# Patient Record
Sex: FEMALE | Race: WHITE | NOT HISPANIC OR LATINO | Employment: OTHER | URBAN - METROPOLITAN AREA
[De-identification: names, ages, dates, MRNs, and addresses within clinical notes are randomized per-mention and may not be internally consistent; named-entity substitution may affect disease eponyms.]

---

## 2024-11-29 ENCOUNTER — APPOINTMENT (OUTPATIENT)
Dept: RADIOLOGY | Facility: MEDICAL CENTER | Age: 69
DRG: 036 | End: 2024-11-29
Attending: STUDENT IN AN ORGANIZED HEALTH CARE EDUCATION/TRAINING PROGRAM
Payer: COMMERCIAL

## 2024-11-29 ENCOUNTER — HOSPITAL ENCOUNTER (INPATIENT)
Facility: MEDICAL CENTER | Age: 69
End: 2024-11-29
Attending: STUDENT IN AN ORGANIZED HEALTH CARE EDUCATION/TRAINING PROGRAM | Admitting: STUDENT IN AN ORGANIZED HEALTH CARE EDUCATION/TRAINING PROGRAM
Payer: COMMERCIAL

## 2024-11-29 DIAGNOSIS — R00.0 SINUS TACHYCARDIA: ICD-10-CM

## 2024-11-29 DIAGNOSIS — J81.0 ACUTE PULMONARY EDEMA (HCC): ICD-10-CM

## 2024-11-29 DIAGNOSIS — I65.22 CAROTID STENOSIS, LEFT: ICD-10-CM

## 2024-11-29 DIAGNOSIS — R47.1 DYSARTHRIA: ICD-10-CM

## 2024-11-29 DIAGNOSIS — R20.0 NUMBNESS: ICD-10-CM

## 2024-11-29 DIAGNOSIS — I65.22 ICAO (INTERNAL CAROTID ARTERY OCCLUSION), LEFT: ICD-10-CM

## 2024-11-29 PROBLEM — G45.9 TRANSIENT ISCHEMIC ATTACK: Status: ACTIVE | Noted: 2024-11-29

## 2024-11-29 LAB
ABO + RH BLD: NORMAL
ABO GROUP BLD: NORMAL
ALBUMIN SERPL BCP-MCNC: 4.5 G/DL (ref 3.2–4.9)
ALBUMIN/GLOB SERPL: 1.6 G/DL
ALP SERPL-CCNC: 96 U/L (ref 30–99)
ALT SERPL-CCNC: 28 U/L (ref 2–50)
ANION GAP SERPL CALC-SCNC: 14 MMOL/L (ref 7–16)
APTT PPP: 27.6 SEC (ref 24.7–36)
AST SERPL-CCNC: 28 U/L (ref 12–45)
BASOPHILS # BLD AUTO: 0.5 % (ref 0–1.8)
BASOPHILS # BLD: 0.04 K/UL (ref 0–0.12)
BILIRUB SERPL-MCNC: 0.2 MG/DL (ref 0.1–1.5)
BLD GP AB SCN SERPL QL: NORMAL
BUN SERPL-MCNC: 15 MG/DL (ref 8–22)
CALCIUM ALBUM COR SERPL-MCNC: 9.5 MG/DL (ref 8.5–10.5)
CALCIUM SERPL-MCNC: 9.9 MG/DL (ref 8.5–10.5)
CHLORIDE SERPL-SCNC: 101 MMOL/L (ref 96–112)
CO2 SERPL-SCNC: 21 MMOL/L (ref 20–33)
CREAT SERPL-MCNC: 0.99 MG/DL (ref 0.5–1.4)
EKG IMPRESSION: NORMAL
EOSINOPHIL # BLD AUTO: 0.19 K/UL (ref 0–0.51)
EOSINOPHIL NFR BLD: 2.4 % (ref 0–6.9)
ERYTHROCYTE [DISTWIDTH] IN BLOOD BY AUTOMATED COUNT: 44 FL (ref 35.9–50)
EST. AVERAGE GLUCOSE BLD GHB EST-MCNC: 97 MG/DL
GFR SERPLBLD CREATININE-BSD FMLA CKD-EPI: 63 ML/MIN/1.73 M 2
GLOBULIN SER CALC-MCNC: 2.9 G/DL (ref 1.9–3.5)
GLUCOSE BLD STRIP.AUTO-MCNC: 104 MG/DL (ref 65–99)
GLUCOSE SERPL-MCNC: 102 MG/DL (ref 65–99)
HBA1C MFR BLD: 5 % (ref 4–5.6)
HCT VFR BLD AUTO: 46.3 % (ref 37–47)
HGB BLD-MCNC: 15.9 G/DL (ref 12–16)
IMM GRANULOCYTES # BLD AUTO: 0.02 K/UL (ref 0–0.11)
IMM GRANULOCYTES NFR BLD AUTO: 0.3 % (ref 0–0.9)
INR PPP: 0.95 (ref 0.87–1.13)
LYMPHOCYTES # BLD AUTO: 3.56 K/UL (ref 1–4.8)
LYMPHOCYTES NFR BLD: 45.2 % (ref 22–41)
MCH RBC QN AUTO: 33.2 PG (ref 27–33)
MCHC RBC AUTO-ENTMCNC: 34.3 G/DL (ref 32.2–35.5)
MCV RBC AUTO: 96.7 FL (ref 81.4–97.8)
MONOCYTES # BLD AUTO: 0.68 K/UL (ref 0–0.85)
MONOCYTES NFR BLD AUTO: 8.6 % (ref 0–13.4)
NEUTROPHILS # BLD AUTO: 3.38 K/UL (ref 1.82–7.42)
NEUTROPHILS NFR BLD: 43 % (ref 44–72)
NRBC # BLD AUTO: 0 K/UL
NRBC BLD-RTO: 0 /100 WBC (ref 0–0.2)
PLATELET # BLD AUTO: 181 K/UL (ref 164–446)
PMV BLD AUTO: 13.3 FL (ref 9–12.9)
POTASSIUM SERPL-SCNC: 4 MMOL/L (ref 3.6–5.5)
PROT SERPL-MCNC: 7.4 G/DL (ref 6–8.2)
PROTHROMBIN TIME: 12.7 SEC (ref 12–14.6)
RBC # BLD AUTO: 4.79 M/UL (ref 4.2–5.4)
RH BLD: NORMAL
SODIUM SERPL-SCNC: 136 MMOL/L (ref 135–145)
TROPONIN T SERPL-MCNC: 9 NG/L (ref 6–19)
WBC # BLD AUTO: 7.9 K/UL (ref 4.8–10.8)

## 2024-11-29 PROCEDURE — 71045 X-RAY EXAM CHEST 1 VIEW: CPT

## 2024-11-29 PROCEDURE — 85730 THROMBOPLASTIN TIME PARTIAL: CPT

## 2024-11-29 PROCEDURE — 700111 HCHG RX REV CODE 636 W/ 250 OVERRIDE (IP): Mod: JG | Performed by: STUDENT IN AN ORGANIZED HEALTH CARE EDUCATION/TRAINING PROGRAM

## 2024-11-29 PROCEDURE — 85610 PROTHROMBIN TIME: CPT

## 2024-11-29 PROCEDURE — 700102 HCHG RX REV CODE 250 W/ 637 OVERRIDE(OP): Performed by: STUDENT IN AN ORGANIZED HEALTH CARE EDUCATION/TRAINING PROGRAM

## 2024-11-29 PROCEDURE — 99223 1ST HOSP IP/OBS HIGH 75: CPT | Performed by: STUDENT IN AN ORGANIZED HEALTH CARE EDUCATION/TRAINING PROGRAM

## 2024-11-29 PROCEDURE — 86850 RBC ANTIBODY SCREEN: CPT

## 2024-11-29 PROCEDURE — 82962 GLUCOSE BLOOD TEST: CPT

## 2024-11-29 PROCEDURE — 94760 N-INVAS EAR/PLS OXIMETRY 1: CPT

## 2024-11-29 PROCEDURE — 80053 COMPREHEN METABOLIC PANEL: CPT

## 2024-11-29 PROCEDURE — 86901 BLOOD TYPING SEROLOGIC RH(D): CPT | Mod: 91

## 2024-11-29 PROCEDURE — 70496 CT ANGIOGRAPHY HEAD: CPT

## 2024-11-29 PROCEDURE — 700117 HCHG RX CONTRAST REV CODE 255: Performed by: STUDENT IN AN ORGANIZED HEALTH CARE EDUCATION/TRAINING PROGRAM

## 2024-11-29 PROCEDURE — 0042T CT-CEREBRAL PERFUSION ANALYSIS: CPT

## 2024-11-29 PROCEDURE — 70498 CT ANGIOGRAPHY NECK: CPT

## 2024-11-29 PROCEDURE — 36415 COLL VENOUS BLD VENIPUNCTURE: CPT

## 2024-11-29 PROCEDURE — 770020 HCHG ROOM/CARE - TELE (206)

## 2024-11-29 PROCEDURE — 85025 COMPLETE CBC W/AUTO DIFF WBC: CPT

## 2024-11-29 PROCEDURE — 93005 ELECTROCARDIOGRAM TRACING: CPT | Performed by: STUDENT IN AN ORGANIZED HEALTH CARE EDUCATION/TRAINING PROGRAM

## 2024-11-29 PROCEDURE — 86900 BLOOD TYPING SEROLOGIC ABO: CPT | Mod: 91

## 2024-11-29 PROCEDURE — A9270 NON-COVERED ITEM OR SERVICE: HCPCS | Performed by: STUDENT IN AN ORGANIZED HEALTH CARE EDUCATION/TRAINING PROGRAM

## 2024-11-29 PROCEDURE — 84484 ASSAY OF TROPONIN QUANT: CPT

## 2024-11-29 PROCEDURE — 99285 EMERGENCY DEPT VISIT HI MDM: CPT

## 2024-11-29 PROCEDURE — 70450 CT HEAD/BRAIN W/O DYE: CPT

## 2024-11-29 PROCEDURE — 83036 HEMOGLOBIN GLYCOSYLATED A1C: CPT

## 2024-11-29 RX ORDER — ONDANSETRON 2 MG/ML
4 INJECTION INTRAMUSCULAR; INTRAVENOUS EVERY 4 HOURS PRN
Status: DISCONTINUED | OUTPATIENT
Start: 2024-11-29 | End: 2024-12-02 | Stop reason: HOSPADM

## 2024-11-29 RX ORDER — PHENOL 1.4 %
1 AEROSOL, SPRAY (ML) MUCOUS MEMBRANE
COMMUNITY

## 2024-11-29 RX ORDER — ACETAMINOPHEN 325 MG/1
650 TABLET ORAL EVERY 6 HOURS PRN
Status: DISCONTINUED | OUTPATIENT
Start: 2024-11-29 | End: 2024-12-02 | Stop reason: HOSPADM

## 2024-11-29 RX ORDER — HYDRALAZINE HYDROCHLORIDE 20 MG/ML
10 INJECTION INTRAMUSCULAR; INTRAVENOUS
Status: DISCONTINUED | OUTPATIENT
Start: 2024-11-29 | End: 2024-11-29

## 2024-11-29 RX ORDER — LABETALOL HYDROCHLORIDE 5 MG/ML
10 INJECTION, SOLUTION INTRAVENOUS EVERY 4 HOURS PRN
Status: DISCONTINUED | OUTPATIENT
Start: 2024-11-29 | End: 2024-11-30

## 2024-11-29 RX ORDER — LABETALOL HYDROCHLORIDE 5 MG/ML
10 INJECTION, SOLUTION INTRAVENOUS
Status: DISCONTINUED | OUTPATIENT
Start: 2024-11-29 | End: 2024-11-29

## 2024-11-29 RX ORDER — ALPRAZOLAM 0.25 MG/1
0.25 TABLET ORAL ONCE
Status: COMPLETED | OUTPATIENT
Start: 2024-11-30 | End: 2024-11-29

## 2024-11-29 RX ORDER — SODIUM CHLORIDE 9 MG/ML
500 INJECTION, SOLUTION INTRAVENOUS
Status: DISCONTINUED | OUTPATIENT
Start: 2024-11-29 | End: 2024-11-29

## 2024-11-29 RX ORDER — ASPIRIN 81 MG/1
81 TABLET, CHEWABLE ORAL DAILY
Status: DISCONTINUED | OUTPATIENT
Start: 2024-11-29 | End: 2024-12-02 | Stop reason: HOSPADM

## 2024-11-29 RX ORDER — ATORVASTATIN CALCIUM 40 MG/1
40 TABLET, FILM COATED ORAL EVERY EVENING
Status: DISCONTINUED | OUTPATIENT
Start: 2024-11-29 | End: 2024-12-02

## 2024-11-29 RX ORDER — AMLODIPINE BESYLATE 10 MG/1
10 TABLET ORAL
Status: DISCONTINUED | OUTPATIENT
Start: 2024-11-30 | End: 2024-11-30

## 2024-11-29 RX ADMIN — ALPRAZOLAM 0.25 MG: 0.25 TABLET ORAL at 23:44

## 2024-11-29 RX ADMIN — AMLODIPINE BESYLATE 10 MG: 10 TABLET ORAL at 23:42

## 2024-11-29 RX ADMIN — ASPIRIN 81 MG: 81 TABLET, CHEWABLE ORAL at 22:46

## 2024-11-29 RX ADMIN — LABETALOL HYDROCHLORIDE 10 MG: 5 INJECTION, SOLUTION INTRAVENOUS at 23:45

## 2024-11-29 RX ADMIN — IOHEXOL 80 ML: 350 INJECTION, SOLUTION INTRAVENOUS at 21:15

## 2024-11-29 RX ADMIN — IOHEXOL 40 ML: 350 INJECTION, SOLUTION INTRAVENOUS at 21:15

## 2024-11-29 RX ADMIN — ATORVASTATIN CALCIUM 40 MG: 40 TABLET, FILM COATED ORAL at 22:46

## 2024-11-29 SDOH — ECONOMIC STABILITY: TRANSPORTATION INSECURITY
IN THE PAST 12 MONTHS, HAS LACK OF RELIABLE TRANSPORTATION KEPT YOU FROM MEDICAL APPOINTMENTS, MEETINGS, WORK OR FROM GETTING THINGS NEEDED FOR DAILY LIVING?: NO

## 2024-11-29 SDOH — ECONOMIC STABILITY: TRANSPORTATION INSECURITY
IN THE PAST 12 MONTHS, HAS THE LACK OF TRANSPORTATION KEPT YOU FROM MEDICAL APPOINTMENTS OR FROM GETTING MEDICATIONS?: NO

## 2024-11-29 ASSESSMENT — SOCIAL DETERMINANTS OF HEALTH (SDOH)
WITHIN THE LAST YEAR, HAVE YOU BEEN KICKED, HIT, SLAPPED, OR OTHERWISE PHYSICALLY HURT BY YOUR PARTNER OR EX-PARTNER?: NO
WITHIN THE LAST YEAR, HAVE TO BEEN RAPED OR FORCED TO HAVE ANY KIND OF SEXUAL ACTIVITY BY YOUR PARTNER OR EX-PARTNER?: NO
WITHIN THE LAST YEAR, HAVE YOU BEEN AFRAID OF YOUR PARTNER OR EX-PARTNER?: NO
WITHIN THE PAST 12 MONTHS, YOU WORRIED THAT YOUR FOOD WOULD RUN OUT BEFORE YOU GOT THE MONEY TO BUY MORE: NEVER TRUE
WITHIN THE PAST 12 MONTHS, THE FOOD YOU BOUGHT JUST DIDN'T LAST AND YOU DIDN'T HAVE MONEY TO GET MORE: NEVER TRUE
WITHIN THE LAST YEAR, HAVE YOU BEEN HUMILIATED OR EMOTIONALLY ABUSED IN OTHER WAYS BY YOUR PARTNER OR EX-PARTNER?: NO
IN THE PAST 12 MONTHS, HAS THE ELECTRIC, GAS, OIL, OR WATER COMPANY THREATENED TO SHUT OFF SERVICE IN YOUR HOME?: NO

## 2024-11-29 ASSESSMENT — ENCOUNTER SYMPTOMS
FOCAL WEAKNESS: 1
PSYCHIATRIC NEGATIVE: 1
CONSTITUTIONAL NEGATIVE: 1
CARDIOVASCULAR NEGATIVE: 1
RESPIRATORY NEGATIVE: 1
GASTROINTESTINAL NEGATIVE: 1
MUSCULOSKELETAL NEGATIVE: 1
EYES NEGATIVE: 1

## 2024-11-29 ASSESSMENT — COGNITIVE AND FUNCTIONAL STATUS - GENERAL
MOBILITY SCORE: 24
DAILY ACTIVITIY SCORE: 24
SUGGESTED CMS G CODE MODIFIER DAILY ACTIVITY: CH
SUGGESTED CMS G CODE MODIFIER MOBILITY: CH

## 2024-11-29 ASSESSMENT — PATIENT HEALTH QUESTIONNAIRE - PHQ9
2. FEELING DOWN, DEPRESSED, IRRITABLE, OR HOPELESS: NOT AT ALL
SUM OF ALL RESPONSES TO PHQ9 QUESTIONS 1 AND 2: 0
1. LITTLE INTEREST OR PLEASURE IN DOING THINGS: NOT AT ALL

## 2024-11-29 ASSESSMENT — PAIN DESCRIPTION - PAIN TYPE: TYPE: ACUTE PAIN

## 2024-11-29 ASSESSMENT — LIFESTYLE VARIABLES
ALCOHOL_USE: NO
HAVE YOU EVER FELT YOU SHOULD CUT DOWN ON YOUR DRINKING: NO
DOES PATIENT WANT TO STOP DRINKING: NO
HOW MANY TIMES IN THE PAST YEAR HAVE YOU HAD 5 OR MORE DRINKS IN A DAY: 0
HAVE PEOPLE ANNOYED YOU BY CRITICIZING YOUR DRINKING: NO
EVER FELT BAD OR GUILTY ABOUT YOUR DRINKING: NO
AVERAGE NUMBER OF DAYS PER WEEK YOU HAVE A DRINK CONTAINING ALCOHOL: 0
TOTAL SCORE: 0
ON A TYPICAL DAY WHEN YOU DRINK ALCOHOL HOW MANY DRINKS DO YOU HAVE: 0
CONSUMPTION TOTAL: NEGATIVE
TOTAL SCORE: 0
TOTAL SCORE: 0
EVER HAD A DRINK FIRST THING IN THE MORNING TO STEADY YOUR NERVES TO GET RID OF A HANGOVER: NO

## 2024-11-29 ASSESSMENT — FIBROSIS 4 INDEX: FIB4 SCORE: 1.93

## 2024-11-30 ENCOUNTER — APPOINTMENT (OUTPATIENT)
Dept: RADIOLOGY | Facility: MEDICAL CENTER | Age: 69
DRG: 036 | End: 2024-11-30
Attending: STUDENT IN AN ORGANIZED HEALTH CARE EDUCATION/TRAINING PROGRAM
Payer: COMMERCIAL

## 2024-11-30 VITALS
HEART RATE: 57 BPM | RESPIRATION RATE: 15 BRPM | DIASTOLIC BLOOD PRESSURE: 75 MMHG | HEIGHT: 64 IN | WEIGHT: 192.46 LBS | SYSTOLIC BLOOD PRESSURE: 166 MMHG | OXYGEN SATURATION: 95 % | BODY MASS INDEX: 32.86 KG/M2 | TEMPERATURE: 97 F

## 2024-11-30 PROBLEM — I63.9 ACUTE CVA (CEREBROVASCULAR ACCIDENT) (HCC): Status: ACTIVE | Noted: 2024-11-29

## 2024-11-30 PROBLEM — I65.22 CAROTID STENOSIS, LEFT: Status: ACTIVE | Noted: 2024-11-30

## 2024-11-30 PROBLEM — I65.22 ICAO (INTERNAL CAROTID ARTERY OCCLUSION), LEFT: Status: ACTIVE | Noted: 2024-11-30

## 2024-11-30 LAB
CHOLEST SERPL-MCNC: 203 MG/DL (ref 100–199)
HDLC SERPL-MCNC: 51 MG/DL
LDLC SERPL CALC-MCNC: 133 MG/DL
TRIGL SERPL-MCNC: 93 MG/DL (ref 0–149)

## 2024-11-30 PROCEDURE — A9270 NON-COVERED ITEM OR SERVICE: HCPCS | Performed by: STUDENT IN AN ORGANIZED HEALTH CARE EDUCATION/TRAINING PROGRAM

## 2024-11-30 PROCEDURE — 700102 HCHG RX REV CODE 250 W/ 637 OVERRIDE(OP): Performed by: PSYCHIATRY & NEUROLOGY

## 2024-11-30 PROCEDURE — 99223 1ST HOSP IP/OBS HIGH 75: CPT | Performed by: PSYCHIATRY & NEUROLOGY

## 2024-11-30 PROCEDURE — 80061 LIPID PANEL: CPT

## 2024-11-30 PROCEDURE — 700101 HCHG RX REV CODE 250: Performed by: INTERNAL MEDICINE

## 2024-11-30 PROCEDURE — 99291 CRITICAL CARE FIRST HOUR: CPT | Performed by: INTERNAL MEDICINE

## 2024-11-30 PROCEDURE — 700102 HCHG RX REV CODE 250 W/ 637 OVERRIDE(OP): Performed by: STUDENT IN AN ORGANIZED HEALTH CARE EDUCATION/TRAINING PROGRAM

## 2024-11-30 PROCEDURE — 770022 HCHG ROOM/CARE - ICU (200)

## 2024-11-30 PROCEDURE — A9270 NON-COVERED ITEM OR SERVICE: HCPCS | Performed by: PSYCHIATRY & NEUROLOGY

## 2024-11-30 PROCEDURE — 99291 CRITICAL CARE FIRST HOUR: CPT | Performed by: STUDENT IN AN ORGANIZED HEALTH CARE EDUCATION/TRAINING PROGRAM

## 2024-11-30 PROCEDURE — 70551 MRI BRAIN STEM W/O DYE: CPT

## 2024-11-30 RX ORDER — NOREPINEPHRINE BITARTRATE 0.03 MG/ML
0-1 INJECTION, SOLUTION INTRAVENOUS CONTINUOUS
Status: DISCONTINUED | OUTPATIENT
Start: 2024-11-30 | End: 2024-12-02

## 2024-11-30 RX ORDER — ASPIRIN 81 MG/1
243 TABLET ORAL ONCE
Status: COMPLETED | OUTPATIENT
Start: 2024-11-30 | End: 2024-11-30

## 2024-11-30 RX ORDER — ALPRAZOLAM 0.25 MG/1
0.25 TABLET ORAL
Status: COMPLETED | OUTPATIENT
Start: 2024-11-30 | End: 2024-11-30

## 2024-11-30 RX ADMIN — NOREPINEPHRINE BITARTRATE 0.05 MCG/KG/MIN: 0.03 INJECTION, SOLUTION INTRAVENOUS at 21:50

## 2024-11-30 RX ADMIN — ALPRAZOLAM 0.25 MG: 0.25 TABLET ORAL at 11:17

## 2024-11-30 RX ADMIN — ATORVASTATIN CALCIUM 40 MG: 40 TABLET, FILM COATED ORAL at 18:13

## 2024-11-30 RX ADMIN — TICAGRELOR 180 MG: 90 TABLET ORAL at 14:11

## 2024-11-30 RX ADMIN — ASPIRIN 243 MG: 81 TABLET, COATED ORAL at 14:51

## 2024-11-30 ASSESSMENT — FIBROSIS 4 INDEX
FIB4 SCORE: 1.93
FIB4 SCORE: 1.93

## 2024-11-30 ASSESSMENT — ENCOUNTER SYMPTOMS
DIARRHEA: 0
SHORTNESS OF BREATH: 0
ABDOMINAL PAIN: 0
HEADACHES: 0
FEVER: 0
NERVOUS/ANXIOUS: 0
NAUSEA: 0
WEAKNESS: 0
VOMITING: 0
BACK PAIN: 0

## 2024-11-30 ASSESSMENT — PAIN DESCRIPTION - PAIN TYPE
TYPE: ACUTE PAIN

## 2024-11-30 NOTE — CARE PLAN
The patient is Watcher - medium risk of patient decline/worsening.    Shift Goals  Clinical Goals: MRI, SPP/VSS, Safety  Patient Goals: Sleep  Family Goals: CATY    Progress made toward(s) clinical / shift goals:      Problem: Optimal Care of the Stroke Patient  Goal: Optimal acute care for the stroke patient  Outcome: Progressing  Note: - Vital signs and neuro checks performed and documented per order   - NIHSS completed and documented per order   - Continuous telemetry monitoring    - Head CT without contrast obtained   - MRI obtained   - Echocardiogram with Bubble Study ordered   - CTA Neck obtained   - Lipid Panel obtained   - Dysphagia screen completed and documented prior to any PO intake.   - Neurology and Neurology IR consult placed      Problem: Knowledge Deficit - Stroke Education  Goal: Patient's knowledge of stroke and risk factors will improve  Outcome: Progressing  Patient educated of disease process and plan of care. Treatment team has included patient in plan of care. All medications indications and side effects were explained. Patient encouraged to ask questions. Patient verbalizes understanding.      Problem: Neuro Status  Goal: Neuro status will remain stable or improve  Outcome: Progressing     Problem: Hemodynamic Monitoring  Goal: Patient's hemodynamics, fluid balance and neurologic status will be stable or improve  Outcome: Progressing  Patient vitals assessed Q4 and prn. Patient on continuous telemetry monitoring. Patient neuro status assessed and intact. Patient assessed for signs of decreased cardiac output. Patient intake and output measured Q4.         Patient is not progressing towards the following goals: N/A

## 2024-11-30 NOTE — HOSPITAL COURSE
Leatha Desai is a 68-year-old female with unremarkable PMHx as she has not seen a physician in decades.  Admitted 11/29 for right sided weakness and right facial droop.    Per history-patient noted progressive weakness starting in her right lower extremity up to her right upper extremity.  It was associated with a right-sided facial droop.  Symptoms lasted for approximately 30 minutes and resolved.  Similar symptoms that occurred approximately 24 hours prior.    In the emergency room-CT head showing small areas of encephalomalacia of the cortex and left frontoparietal vertex.  CTA neck with subtotal occlusion of left proximal internal carotid artery.  CTA head normal.    MRI brain is pending.

## 2024-11-30 NOTE — PROGRESS NOTES
Pt off unit for MRI head, monitor room notified pt off tele. Tele monitor at bedside.     1222: Pt back from MRI, monitor room notified.

## 2024-11-30 NOTE — CONSULTS
Neurology STROKE CODE H&P  Neurohospitalist Service, [facility]    Referring Physician: Diana Prasad M.D.    STROKE CODE:   Chief Complaint   Patient presents with    Numbness     Pt reports right foot cramping approx 1 hour prior that radiated up her right leg and turned into numbness. Pt states the numbness then traveled up her right arm and she felt as though she cannot speak. Pt says that she feels some numbness in her RUE but leg numbness has subsided. -thinners/ASA       To obtain the most accurate data regarding the time called, and time patient seen, refer to the stroke run-sheet and chart.  For time of CT, refer to the radiology report. See A&P below for TPA Decision and door to needle time if and when applicable.    HPI:   68-year-old female does not see a physician regularly presented last night after a 30-minute episode of right-sided numbness and speech issues.  She says she cannot understand anything being spoken to her at the time.  Symptoms have completely resolved.  CTA last night showed a critical stenosis of the left ICA at the bifurcation.  MRI brain this morning showed small embolic appearing infarct over the left hemisphere.  Does not take medications at home.      Review of systems: In addition to what is detailed in the HPI above, (and scanned into the chart if and when applicable), all other systems reviewed and are negative.    Past Medical History:    has no past medical history on file.  Unknown    FHx:  family history is not on file.  No relief of stroke    SHx:   reports that she has never smoked. She has never used smokeless tobacco. She reports that she does not currently use alcohol. She reports that she does not use drugs.    Allergies:  Allergies   Allergen Reactions    Bloodless        Medications:    Current Facility-Administered Medications:     [START ON 12/1/2024] ticagrelor (Brilinta) tablet 90 mg, 90 mg, Oral, BID, Mike Pepper M.D.     ticagrelor (Brilinta) tablet 180 mg, 180 mg, Oral, Once, Mike Pepper M.D.    aspirin (Asa) chewable tab 81 mg, 81 mg, Oral, DAILY, Amaury Jennings M.D., 81 mg at 11/29/24 2246    atorvastatin (Lipitor) tablet 40 mg, 40 mg, Oral, Q EVENING, Amaury Jennings M.D., 40 mg at 11/29/24 2246    acetaminophen (Tylenol) tablet 650 mg, 650 mg, Oral, Q6HRS PRN, Amaury Jennings M.D.    labetalol (Normodyne/Trandate) injection 10 mg, 10 mg, Intravenous, Q4HRS PRN, Amaury Jennings M.D., 10 mg at 11/29/24 2345    ondansetron (Zofran) syringe/vial injection 4 mg, 4 mg, Intravenous, Q4HRS PRN, Amaury Jennings M.D.    amLODIPine (Norvasc) tablet 10 mg, 10 mg, Oral, Q DAY, Amaury Jennings M.D., 10 mg at 11/29/24 2342    Physical Examination:    Vitals:    11/30/24 0035 11/30/24 0300 11/30/24 0712 11/30/24 1118   BP: (!) 146/81  111/84 (!) 148/96   Pulse:  (!) 58 61 76   Resp:  15 16 16   Temp:  36.5 °C (97.7 °F) 36.5 °C (97.7 °F) 36.4 °C (97.5 °F)   TempSrc:  Temporal Temporal Temporal   SpO2:  97% 97% 97%   Weight:  88.5 kg (195 lb 1.7 oz)     Height:           General: Patient is awake and in no acute distress  Eyes: Cannot visualize fundus.  CV: RRR    NEUROLOGICAL EXAM:     Mental status: Awake, alert and fully oriented, follows commands  Speech and language: speech is clear and fluent. The patient is able to name and repeat.  Cranial nerve exam: Pupils are equal, round and reactive to light bilaterally. Visual fields are full. Extraocular muscles are intact. Sensation in the face is intact to light touch. Face is symmetric. Hearing to finger rub equal. Palate elevates symmetrically. Shoulder shrug is full. Tongue is midline.  Motor exam: Sustained antigravity and in all 4   sensory exam: No sensory deficits identified   Deep tendon reflexes:  2+ and symmetric. Toes down-going bilaterally.  Coordination: no ataxia   Gait: Normal    NIH Stroke Scale:    1a. Level of Consciousness (Alert, drowsy, etc): 0=  Alert    1b. LOC Questions (Month, age): 0= Answers both correctly    1c. LOC Commands (Open/close eyes make fist/let go): 0= Obeys both correctly    2.   Best Gaze (Eyes open - patient follows examiner's finger on face): 0= Normal    3.   Visual Fields (introduce visual stimulus/threat to patient's field quadrants): 0= No visual loss  4.   Facial Paresis (Show teeth, raise eyebrows and squeeze eyes shut): 0= Normal     5a. Motor Arm - Left (Elevate arm to 90 degrees if patient is sitting, 45 degrees if  supine): 0= No drift    5b. Motor Arm - Right (Elevate arm to 90 degrees if patient is sitting, 45 degrees if supine): 0= No drift    6a. Motor Leg - Left (Elevate leg 30 degrees with patient supine): 0= No drift    6b. Motor Leg - Right  (Elevate leg 30 degrees with patient supine): 0= No drift    7.   Limb Ataxia (Finger-nose, heel down shin): 0= No ataxia    8.   Sensory (Pin prick to face, arm, trunk and leg - compare side to side): 0= Normal    9.  Best Language (Name item, describe a picture and read sentences): 0= No aphasia    10. Dysarthria (Evaluate speech clarity by patient repeating listed words): 0= Normal articulation    11. Extinction and Inattention (Use information from prior testing to identify neglect or  double simultaneous stimuli testing): 0= No neglect    Total NIH Score: 0    Modified [place] Scale (MRS): 0 = No symptoms      Objective Data:    Labs:  Lab Results   Component Value Date/Time    PROTHROMBTM 12.7 11/29/2024 08:27 PM    INR 0.95 11/29/2024 08:27 PM      Lab Results   Component Value Date/Time    WBC 7.9 11/29/2024 08:27 PM    RBC 4.79 11/29/2024 08:27 PM    HEMOGLOBIN 15.9 11/29/2024 08:27 PM    HEMATOCRIT 46.3 11/29/2024 08:27 PM    MCV 96.7 11/29/2024 08:27 PM    MCH 33.2 (H) 11/29/2024 08:27 PM    MCHC 34.3 11/29/2024 08:27 PM    MPV 13.3 (H) 11/29/2024 08:27 PM    NEUTSPOLYS 43.00 (L) 11/29/2024 08:27 PM    LYMPHOCYTES 45.20 (H) 11/29/2024 08:27 PM    MONOCYTES 8.60 11/29/2024  08:27 PM    EOSINOPHILS 2.40 11/29/2024 08:27 PM    BASOPHILS 0.50 11/29/2024 08:27 PM      Lab Results   Component Value Date/Time    SODIUM 136 11/29/2024 08:27 PM    POTASSIUM 4.0 11/29/2024 08:27 PM    CHLORIDE 101 11/29/2024 08:27 PM    CO2 21 11/29/2024 08:27 PM    GLUCOSE 102 (H) 11/29/2024 08:27 PM    BUN 15 11/29/2024 08:27 PM    CREATININE 0.99 11/29/2024 08:27 PM      Lab Results   Component Value Date/Time    CHOLSTRLTOT 203 (H) 11/30/2024 02:24 AM     (H) 11/30/2024 02:24 AM    HDL 51 11/30/2024 02:24 AM    TRIGLYCERIDE 93 11/30/2024 02:24 AM       Lab Results   Component Value Date/Time    ALKPHOSPHAT 96 11/29/2024 08:27 PM    ASTSGOT 28 11/29/2024 08:27 PM    ALTSGPT 28 11/29/2024 08:27 PM    TBILIRUBIN 0.2 11/29/2024 08:27 PM        Imaging/Testing:  MR-BRAIN-W/O   Final Result      1.  Several punctate acute to subacute left temporal and parietal lobe infarcts.   2.  Slow flow or occlusion of the left internal carotid artery below the level of the clinoid process. There is reconstitution at the level of the [place].   3.  Mild diffuse cerebral and cerebellar substance loss.   4.  Mild microangiopathic ischemic change versus demyelination or gliosis.      DX-CHEST-PORTABLE (1 VIEW)   Final Result      Interstitial pulmonary edema      CT-CTA NECK WITH & W/O-POST PROCESSING   Final Result      Occlusion or subtotal occlusion of the proximal LEFT internal carotid artery with estimated 50% stenosis of the petrous portion of the LEFT internal carotid artery      CT-CTA HEAD WITH & W/O-POST PROCESS   Final Result      CT angiogram of the [place] within normal limits.         CT-CEREBRAL PERFUSION ANALYSIS   Final Result      1. Cerebral blood flow less than 30% possibly representing completed infarct = 0 mL.      2. T Max more than 6 seconds possibly representing combination of completed infarct and ischemia = 0 mL.      3. Mismatched volume possibly representing ischemic  "brain/penumbra= 0 mL      4.  Please note that this cerebral perfusion study and report is Quantitative and targets supratentorial (cerebral) perfusion for evaluation of large vessel territory acute ischemia/infarction. For example, lacunar infarcts, and brainstem/posterior fossa    ischemia/infarction are not evaluated on this study.  Data acquisition is subject to artifacts which can yield non-anatomically plausible perfusion maps which may be due to motion, bolus timing, signal to noise ratio, or other technical factors.    Perfusion map abnormalities which show non-anatomic distributions are likely artifact.   This study is not \"stand-alone\" and should only be utilized for diagnosis, management/treatment in correlation with CT, CTA, and/or MRI and clinical factors.         CT-HEAD W/O   Final Result      1.  No acute intracranial abnormality.   2.  Small areas of encephalomalacia in the cortex of the LEFT frontoparietal vertex   3.  Senescent changes            EC-ECHOCARDIOGRAM COMPLETE W/O CONT    (Results Pending)   IR-CONSULT AND TREAT    (Results Pending)         Assessment and Plan:    68-year-old female presenting with left MCA symptoms found to have a critical stenosis of the left ICA just distal from the bifurcation.  Based on my review and independent interpretation about 99% occluded of the left ICA with perfusion deficiency over the last hemisphere on the perfusion scans.  MRI brain shows tiny embolic appearing infarcts over the left parietal frontal temporal lobe.  Patient most likely is perfusion dependent based on the perfusion scans.  Needs elevated blood pressure to help prevent further infarcts.  Recommend transfer to [facility] for strict blood pressure parameters.  Spoke to neuro IR will plan to stent the left ICA tomorrow morning.  Will load with Brilinta now.  Continue with aspirin.  Etiology infarcts severe arthrosclerotic disease of the left ICA causing perfusion deficits over the left " hemisphere.    Plan:  1.  [facility] transfer for blood pressure control.  2.  Keep the blood pressure between 160-200 systolic.  If she has further symptom please raise blood pressure by 20 points stat.  Use pressors if needed.  3.  Brilinta load 180 mg now.  Followed by 90 mg twice daily for 6 months.  4.  High-dose statin for an LDL goal below 70.  5.  Continue aspirin 81 mg daily.  6.  Neuro IR consultation for left ICA stent.  7.  After stent placement tomorrow pressure will need to be normalized between 110-140 systolic.  8.  Maintain normoglycemia.                  The evaluation of the patient, and recommended management, was discussed with the resident staff.     This chart was partially generated using voice recognition technology and sound alike word replacement may be present, best efforts were made to make the chart accurate.    Mike Pepper MD  Board Certified Neurology, ABPN  (t) [phone]

## 2024-11-30 NOTE — H&P
Hospital Medicine History & Physical Note    Date of Service  11/29/2024    Primary Care Physician  No primary care provider on file.    Consultants  None    Code Status  Full Code    Chief Complaint  Chief Complaint   Patient presents with    Numbness     Pt reports right foot cramping approx 1 hour prior that radiated up her right leg and turned into numbness. Pt states the numbness then traveled up her right arm and she felt as though she cannot speak. Pt says that she feels some numbness in her RUE but leg numbness has subsided. -thinners/ASA       History of Presenting Illness  Leatha Desai is a 68 y.o. female who presented 11/29/2024 with a TIA.  Patient has not seen a doctor in several decades and is not on any medications.  At about 7 PM today, she reports that she had a cramping feeling in her right foot and then subsequently followed by weakness that traveled up her right upper extremity and to her face.  She was noted to have a right-sided facial droop and weakness in the right upper and lower extremity.  At about 7:30 PM, her symptoms resolved and she had  completely recovered.  She states that something similar happened about 24 hours ago and also resolved within 30 minutes.  She decided to come into the ER for further evaluation.    At [facility], CT head showing small areas of encephalomalacia in the cortex of the left frontal parietal vertex.  CT neck showing occlusion or subtotal occlusion of the proximal left internal carotid artery with estimated 50% stenosis of the petrous portion of the left internal carotid artery.  EKG shows normal sinus rhythm with no ischemic changes.    I discussed the plan of care with patient.    Review of Systems  Review of Systems   Constitutional: Negative.    HENT: Negative.     Eyes: Negative.    Respiratory: Negative.     Cardiovascular: Negative.    Gastrointestinal: Negative.    Genitourinary: Negative.    Musculoskeletal: Negative.    Skin: Negative.     Neurological:  Positive for focal weakness.   Endo/Heme/Allergies: Negative.    Psychiatric/Behavioral: Negative.         Past Medical History   has no past medical history on file.    Surgical History   has no past surgical history on file.     Family History  family history is not on file.   Family history reviewed with patient. There is no family history that is pertinent to the chief complaint.     Social History   reports that she has never smoked. She has never used smokeless tobacco. She reports that she does not currently use alcohol. She reports that she does not use drugs.    Allergies  No Known Allergies    Medications  Prior to Admission Medications   Prescriptions Last Dose Informant Patient Reported? Taking?   Multiple Vitamins-Minerals (MULTIVITAMIN ADULTS 50+) Tab 11/29/2024 Morning Patient Yes Yes   Sig: Take 1 Tablet by mouth every 48 hours.      Facility-Administered Medications: None       Physical Exam  Temp:  [36.1 °C (97 °F)] 36.1 °C (97 °F)  Pulse:  [] 89  Resp:  [13-19] 19  BP: (159-188)/() 168/78  SpO2:  [96 %-99 %] 96 %  Blood Pressure : (!) 168/78   Temperature: 36.1 °C (97 °F)   Pulse: 89   Respiration: 19   Pulse Oximetry: 96 %       Physical Exam  Constitutional:       Appearance: Normal appearance. She is obese.   HENT:      Head: Normocephalic.      Nose: Nose normal.      Mouth/Throat:      Mouth: Mucous membranes are moist.   Eyes:      Pupils: Pupils are equal, round, and reactive to light.   Cardiovascular:      Rate and Rhythm: Normal rate and regular rhythm.      Pulses: Normal pulses.   Pulmonary:      Effort: Pulmonary effort is normal.      Breath sounds: Normal breath sounds.   Abdominal:      General: Abdomen is flat. Bowel sounds are normal.      Palpations: Abdomen is soft.   Musculoskeletal:         General: Normal range of motion.      Cervical back: Neck supple.   Skin:     General: Skin is warm.   Neurological:      General: No focal deficit present.     "  Mental Status: She is alert and oriented to person, place, and time. Mental status is at baseline.   Psychiatric:         Mood and Affect: Mood normal.         Behavior: Behavior normal.         Thought Content: Thought content normal.         Judgment: Judgment normal.         Laboratory:  Recent Labs     11/29/24 2027   WBC 7.9   RBC 4.79   HEMOGLOBIN 15.9   HEMATOCRIT 46.3   MCV 96.7   MCH 33.2*   MCHC 34.3   RDW 44.0   PLATELETCT 181   MPV 13.3*     Recent Labs     11/29/24 2027   SODIUM 136   POTASSIUM 4.0   CHLORIDE 101   CO2 21   GLUCOSE 102*   BUN 15   CREATININE 0.99   CALCIUM 9.9     Recent Labs     11/29/24 2027   ALTSGPT 28   ASTSGOT 28   ALKPHOSPHAT 96   TBILIRUBIN 0.2   GLUCOSE 102*     Recent Labs     11/29/24 2027   APTT 27.6   INR 0.95     No results for input(s): \"NTPROBNP\" in the last 72 hours.      Recent Labs     11/29/24 2027   TROPONINT 9       Imaging:  DX-CHEST-PORTABLE (1 VIEW)   Final Result      Interstitial pulmonary edema      CT-CTA NECK WITH & W/O-POST PROCESSING   Final Result      Occlusion or subtotal occlusion of the proximal LEFT internal carotid artery with estimated 50% stenosis of the petrous portion of the LEFT internal carotid artery      CT-CTA HEAD WITH & W/O-POST PROCESS   Final Result      CT angiogram of the [place] within normal limits.         CT-CEREBRAL PERFUSION ANALYSIS   Final Result      1. Cerebral blood flow less than 30% possibly representing completed infarct = 0 mL.      2. T Max more than 6 seconds possibly representing combination of completed infarct and ischemia = 0 mL.      3. Mismatched volume possibly representing ischemic brain/penumbra= 0 mL      4.  Please note that this cerebral perfusion study and report is Quantitative and targets supratentorial (cerebral) perfusion for evaluation of large vessel territory acute ischemia/infarction. For example, lacunar infarcts, and brainstem/posterior fossa    ischemia/infarction are not " "evaluated on this study.  Data acquisition is subject to artifacts which can yield non-anatomically plausible perfusion maps which may be due to motion, bolus timing, signal to noise ratio, or other technical factors.    Perfusion map abnormalities which show non-anatomic distributions are likely artifact.   This study is not \"stand-alone\" and should only be utilized for diagnosis, management/treatment in correlation with CT, CTA, and/or MRI and clinical factors.         CT-HEAD W/O   Final Result      1.  No acute intracranial abnormality.   2.  Small areas of encephalomalacia in the cortex of the LEFT frontoparietal vertex   3.  Senescent changes            MR-BRAIN-W/O    (Results Pending)       EKG:  I have personally reviewed the images and compared with prior images.    Assessment/Plan:  Justification for Admission Status  I anticipate this patient will require at least two midnights for appropriate medical management, necessitating inpatient admission because patient had a likely TIA    Patient will need a Telemetry bed on NEUROLOGY service .  The need is secondary to TIA.    * Transient ischemic attack- (present on admission)  Assessment & Plan  Patient's symptoms are strongly suggestive of TIA.  NIH score 0 upon my assessment with her  CT showing small areas of encephalomalacia in the cortex of the left frontal parietal vertex.  CT neck showing occlusion or subtotal occlusion of the left proximal carotid artery with estimated 50% stenosis of the petrous portion of the left internal carotid artery.  Will start aspirin and Lipitor overnight due to patient's presentation and CAT scan findings  A1c lipid panel  MRI brain without contrast  Bubble study  PT OT          VTE prophylaxis: pharmacologic prophylaxis contraindicated due to risk of hemorrhagic conversion  "

## 2024-11-30 NOTE — ED PROVIDER NOTES
"ED Provider Note    CHIEF COMPLAINT  Chief Complaint   Patient presents with    Numbness     Pt reports right foot cramping approx 1 hour prior that radiated up her right leg and turned into numbness. Pt states the numbness then traveled up her right arm and she felt as though she cannot speak. Pt says that she feels some numbness in her RUE but leg numbness has subsided. -thinners/ASA       HPI/ROS  LIMITATION TO HISTORY   Select: : None  OUTSIDE HISTORIAN(S):   Family reports patient was kishabling    Leatha Desai is a 68 y.o. female who presents with right foot cramping, progressing to numbness and right upper extremity numbness as well in the context of some word finding difficulty.  Family reports disorganized speech.  Patient had symptoms occur 1 hour prior to arrival.  Patient denies chest pain, shortness of breath, nausea vomiting diarrhea.  Patient denies fever chills body aches or sweats.  Patient denies changes.  Patient does not see a doctor.    PAST MEDICAL HISTORY       SURGICAL HISTORY  patient denies any surgical history    FAMILY HISTORY  History reviewed. No pertinent family history.    SOCIAL HISTORY  Social History     Tobacco Use    Smoking status: Never    Smokeless tobacco: Never   Substance and Sexual Activity    Alcohol use: Not Currently    Drug use: Never    Sexual activity: Not on file       CURRENT MEDICATIONS  Home Medications       Reviewed by Nessa Camacho (Pharmacy Tech) on 11/29/24 at 2106  Med List Status: Complete     Medication Last Dose Status   Multiple Vitamins-Minerals (MULTIVITAMIN ADULTS 50+) Tab 11/29/2024 Active                  Audit from Redirected Encounters    **Home medications have not yet been reviewed for this encounter**         ALLERGIES  No Known Allergies    PHYSICAL EXAM  VITAL SIGNS: BP (!) 168/78   Pulse 89   Temp 36.1 °C (97 °F) (Temporal)   Resp 19   Ht 1.626 m (5' 4\")   Wt 88.5 kg (195 lb)   SpO2 96%   BMI 33.47 kg/m²    Vitals and " nursing note reviewed.   Constitutional:       Comments: Patient is lying in bed supine, pleasant, conversant, speaking in complete sentences   HENT:      Head: Normocephalic and atraumatic.   Eyes:      Extraocular Movements: Extraocular movements intact.      Conjunctiva/sclera: Conjunctivae normal.      Pupils: Pupils are equal, round, and reactive to light.   Cardiovascular:      Pulses: Normal pulses.      Comments:   Pulmonary:      Effort: Pulmonary effort is normal. No respiratory distress.   Musculoskeletal:         General: No swelling. Normal range of motion.      Cervical back: Normal range of motion. No rigidity.   Skin:     General: Skin is warm and dry.      Capillary Refill: Capillary refill takes less than 2 seconds.   Neurological:      Mental Status: Alert. CN II-XII intact, speech normal, motor strength 5/5 in all four extremities, normal  strength bilaterally, sensation to light touch grossly intact in all extremities, no finger to nose dysmetria, no pronator drift, no nystagmus, AO x3      EKG/LABS  No evidence of acute ischemia  I have independently interpreted this EKG    RADIOLOGY/PROCEDURES   I have independently interpreted the diagnostic imaging associated with this visit and am waiting the final reading from the radiologist.   My preliminary interpretation is as follows: No intracranial hemorrhage    Radiologist interpretation:  DX-CHEST-PORTABLE (1 VIEW)   Final Result      Interstitial pulmonary edema      CT-CTA NECK WITH & W/O-POST PROCESSING   Final Result      Occlusion or subtotal occlusion of the proximal LEFT internal carotid artery with estimated 50% stenosis of the petrous portion of the LEFT internal carotid artery      CT-CTA HEAD WITH & W/O-POST PROCESS   Final Result      CT angiogram of the [place] within normal limits.         CT-CEREBRAL PERFUSION ANALYSIS   Final Result      1. Cerebral blood flow less than 30% possibly representing completed infarct  "= 0 mL.      2. T Max more than 6 seconds possibly representing combination of completed infarct and ischemia = 0 mL.      3. Mismatched volume possibly representing ischemic brain/penumbra= 0 mL      4.  Please note that this cerebral perfusion study and report is Quantitative and targets supratentorial (cerebral) perfusion for evaluation of large vessel territory acute ischemia/infarction. For example, lacunar infarcts, and brainstem/posterior fossa    ischemia/infarction are not evaluated on this study.  Data acquisition is subject to artifacts which can yield non-anatomically plausible perfusion maps which may be due to motion, bolus timing, signal to noise ratio, or other technical factors.    Perfusion map abnormalities which show non-anatomic distributions are likely artifact.   This study is not \"stand-alone\" and should only be utilized for diagnosis, management/treatment in correlation with CT, CTA, and/or MRI and clinical factors.         CT-HEAD W/O   Final Result      1.  No acute intracranial abnormality.   2.  Small areas of encephalomalacia in the cortex of the LEFT frontoparietal vertex   3.  Senescent changes            MR-BRAIN-W/O    (Results Pending)       COURSE & MEDICAL DECISION MAKING    ASSESSMENT, COURSE AND PLAN  Care Narrative: No focal neurologic deficits at this time.  No evidence of anterior circulation CVA.  Stroke activation has not been called at this time.  CT imaging of the head, perfusion studies, angiography has been ordered to rule out acute stroke, large vessel occlusion, intracranial hemorrhage, intracranial mass or other acute intracranial process.  CBC to evaluate for acute anemia and leukocytosis.  CMP to evaluate for acute electrolyte abnormality, acute kidney injury, acute liver failure or dysfunction.  EKG, troponin to evaluate for ACS versus arrhythmia.  Coags ordered as well.    Electronically signed by: Dickson Lilly M.D., 11/29/2024 8:26 PM    CMP " demonstrates no evidence of acute kidney injury, acute electrolyte abnormality, acute liver failure, CBC demonstrates no evidence of acute anemia or leukocytosis.  Troponin negative, EKG nonischemic, ACS less likely at this time as his arrhythmia.  Story is very concerning especially in the context of a patient who likely has untreated medical conditions.  I believe that she would warrant inpatient monitoring, MRI imaging, echocardiogram for full TIA workup.    This dictation has been created using voice recognition software. I am continuously working with the software to minimize the number of voice recognition errors and I have made every attempt to manually correct the errors within my dictation. However errors  related to this voice recognition software may still exist and should be interpreted within the appropriate context.     Electronically signed by: Dickson Lilly M.D., 11/29/2024 10:17 PM      HEART SCORE:  History - 0  EKG - 0  Age - 2  Risk Factors - 2  Initial Troponin - 0  Total - 4        FINAL DIAGNOSIS  1. Numbness    2. Dysarthria    3. Acute pulmonary edema (HCC)    4. Sinus tachycardia         Electronically signed by: Dickson Lilly M.D., 11/29/2024 8:24 PM

## 2024-11-30 NOTE — ED NOTES
MRI screening completed. Patient's 1 necklace and 1 ring removed and given to daughter at bedside.

## 2024-11-30 NOTE — PROGRESS NOTES
Bedside report received, assumed care of patient at change of shift. Chart, labs, and orders reviewed. Pt resting in bed, breathing even and unlabored, denies pain and in no acute distress. A&O x4. Tele monitoring in place. Fall precautions including bed alarm in place and education provided. Call light within reach, bed locked and in lowest position, denies other needs at this time.

## 2024-11-30 NOTE — PROGRESS NOTES
Received patient from ED with no signs of acute distress: A&Ox4, RA, 0/10 pain, telemetry box placed and monitor room notified, skin check, admission profile, and assessment completed. Discussed POC, labs, medications, and treatments with patient who demonstrates understanding with no additional questions right now. Bed locked/lowest position, patient is refusing bed alarm. Call bell and possessions within reach and patient denies additional needs at this time.

## 2024-11-30 NOTE — CONSULTS
Critical Care Consultation    Date of consult: 11/30/2024    Referring Physician  Diana Prasad MD    Reason for Consultation  Left ICA stenosis    History of Presenting Illness  68 y.o. female with no known medical history who was admitted on 11/29 after had a right side weakness and numbness and right facial droop on Thursday 11/28.  symptoms were completely resolved.  Yesterday she again had the symptoms and resolved fairly quickly.  Initial blood pressure at the ED was 224/103. CTA show critical stenosis of the left ICA at the bifurcation.  MRI of the brain shows small embolic appearing infarct over the left hemisphere.  Neurology was consulted and plan for left ICA stent placement tomorrow morning.  Recommend blood pressure goal to be 160-200.  Patient is loaded with Brilinta 180 mg now and follow-up with 90 mg twice a day.  Patient is on aspirin 81 mg daily    Pt also would like everyone to know that she has a flight to catch on Wednesday at 11.30am back to [place]. She lives in [place], [place] and visiting daughter here for Thanksgiving. She said she will not cancel the flight and would leave hospital AMA on Wednesday if she is still in hospital by then.    Code Status  Full Code    Review of Systems  Review of Systems   Constitutional:  Negative for fever and malaise/fatigue.   Respiratory:  Negative for shortness of breath.    Cardiovascular:  Negative for chest pain and leg swelling.   Gastrointestinal:  Negative for abdominal pain, diarrhea, nausea and vomiting.   Musculoskeletal:  Negative for back pain.   Neurological:  Negative for weakness and headaches.   Psychiatric/Behavioral:  The patient is not nervous/anxious.    All other systems reviewed and are negative.      Past Medical History   has no past medical history on file.    Surgical History   has no past surgical history on file.    Family History  family history is not on file.    Social History   reports that she has never smoked. She has never  used smokeless tobacco. She reports that she does not currently use alcohol. She reports that she does not use drugs.    Medications  Home Medications       Reviewed by Anushka Rutledge R.N. (Registered Nurse) on 11/29/24 at 2316  Med List Status: Complete     Medication Last Dose Status   Multiple Vitamins-Minerals (MULTIVITAMIN ADULTS 50+) Tab 11/29/2024 Active                  Audit from Redirected Encounters    **Home medications have not yet been reviewed for this encounter**       Current Facility-Administered Medications   Medication Dose Route Frequency Provider Last Rate Last Admin    [START ON 12/1/2024] ticagrelor (Brilinta) tablet 90 mg  90 mg Oral BID Mike Pepper M.D.        aspirin (Asa) chewable tab 81 mg  81 mg Oral DAILY Amaury Jennings M.D.   81 mg at 11/29/24 2246    atorvastatin (Lipitor) tablet 40 mg  40 mg Oral Q EVENING Amaury Jennings M.D.   40 mg at 11/29/24 2246    acetaminophen (Tylenol) tablet 650 mg  650 mg Oral Q6HRS PRN Amaury Jennings M.D.        ondansetron (Zofran) syringe/vial injection 4 mg  4 mg Intravenous Q4HRS PRN Amaury Jennings M.D.           Allergies  Allergies   Allergen Reactions    Bloodless        Vital Signs last 24 hours  Temp:  [36.1 °C (97 °F)-36.7 °C (98.1 °F)] 36.7 °C (98.1 °F)  Pulse:  [] 62  Resp:  [13-19] 16  BP: ()/() 99/77  SpO2:  [96 %-99 %] 98 %    Physical Exam  Physical Exam  Vitals and nursing note reviewed.   Constitutional:       General: She is not in acute distress.     Appearance: She is ill-appearing and toxic-appearing. She is not diaphoretic.   HENT:      Head: Normocephalic and atraumatic.      Mouth/Throat:      Mouth: Mucous membranes are dry.   Cardiovascular:      Rate and Rhythm: Normal rate and regular rhythm.      Pulses: Normal pulses.      Heart sounds: Normal heart sounds. No murmur heard.  Pulmonary:      Effort: No respiratory distress.      Breath sounds: Normal breath sounds. No wheezing, rhonchi  or rales.   Abdominal:      General: There is no distension.      Palpations: Abdomen is soft.      Tenderness: There is no abdominal tenderness. There is no guarding.   Musculoskeletal:      Cervical back: Neck supple.      Right lower leg: No edema.      Left lower leg: No edema.   Skin:     Capillary Refill: Capillary refill takes less than 2 seconds.      Coloration: Skin is not jaundiced.      Findings: No bruising or rash.   Neurological:      General: No focal deficit present.      Mental Status: She is alert and oriented to person, place, and time.      Comments: Nonfocal exam  No facial droop  No dysarthria   Psychiatric:         Mood and Affect: Mood normal.         Fluids    Intake/Output Summary (Last 24 hours) at 11/30/2024 1520  Last data filed at 11/30/2024 0757  Gross per 24 hour   Intake 638 ml   Output --   Net 638 ml       Laboratory  Recent Results (from the past 48 hours)   POCT glucose device results    Collection Time: 11/29/24  8:23 PM   Result Value Ref Range    POC Glucose, Blood 104 (H) 65 - 99 mg/dL   CBC WITH DIFFERENTIAL    Collection Time: 11/29/24  8:27 PM   Result Value Ref Range    WBC 7.9 4.8 - 10.8 K/uL    RBC 4.79 4.20 - 5.40 M/uL    Hemoglobin 15.9 12.0 - 16.0 g/dL    Hematocrit 46.3 37.0 - 47.0 %    MCV 96.7 81.4 - 97.8 fL    MCH 33.2 (H) 27.0 - 33.0 pg    MCHC 34.3 32.2 - 35.5 g/dL    RDW 44.0 35.9 - 50.0 fL    Platelet Count 181 164 - 446 K/uL    MPV 13.3 (H) 9.0 - 12.9 fL    Neutrophils-Polys 43.00 (L) 44.00 - 72.00 %    Lymphocytes 45.20 (H) 22.00 - 41.00 %    Monocytes 8.60 0.00 - 13.40 %    Eosinophils 2.40 0.00 - 6.90 %    Basophils 0.50 0.00 - 1.80 %    Immature Granulocytes 0.30 0.00 - 0.90 %    Nucleated RBC 0.00 0.00 - 0.20 /100 WBC    Neutrophils (Absolute) 3.38 1.82 - 7.42 K/uL    Lymphs (Absolute) 3.56 1.00 - 4.80 K/uL    Monos (Absolute) 0.68 0.00 - 0.85 K/uL    Eos (Absolute) 0.19 0.00 - 0.51 K/uL    Baso (Absolute) 0.04 0.00 - 0.12 K/uL    Immature Granulocytes  (abs) 0.02 0.00 - 0.11 K/uL    NRBC (Absolute) 0.00 K/uL   COMP METABOLIC PANEL    Collection Time: 24  8:27 PM   Result Value Ref Range    Sodium 136 135 - 145 mmol/L    Potassium 4.0 3.6 - 5.5 mmol/L    Chloride 101 96 - 112 mmol/L    Co2 21 20 - 33 mmol/L    Anion Gap 14.0 7.0 - 16.0    Glucose 102 (H) 65 - 99 mg/dL    Bun 15 8 - 22 mg/dL    Creatinine 0.99 0.50 - 1.40 mg/dL    Calcium 9.9 8.5 - 10.5 mg/dL    Correct Calcium 9.5 8.5 - 10.5 mg/dL    AST(SGOT) 28 12 - 45 U/L    ALT(SGPT) 28 2 - 50 U/L    Alkaline Phosphatase 96 30 - 99 U/L    Total Bilirubin 0.2 0.1 - 1.5 mg/dL    Albumin 4.5 3.2 - 4.9 g/dL    Total Protein 7.4 6.0 - 8.2 g/dL    Globulin 2.9 1.9 - 3.5 g/dL    A-G Ratio 1.6 g/dL   PROTHROMBIN TIME    Collection Time: 24  8:27 PM   Result Value Ref Range    PT 12.7 12.0 - 14.6 sec    INR 0.95 0.87 - 1.13   APTT    Collection Time: 24  8:27 PM   Result Value Ref Range    APTT 27.6 24.7 - 36.0 sec   TROPONIN    Collection Time: 24  8:27 PM   Result Value Ref Range    Troponin T 9 6 - 19 ng/L   ESTIMATED GFR    Collection Time: 24  8:27 PM   Result Value Ref Range    GFR (CKD-EPI) 63 >60 mL/min/1.73 m 2   Hemoglobin A1C    Collection Time: 24  8:27 PM   Result Value Ref Range    Glycohemoglobin 5.0 4.0 - 5.6 %    Est Avg Glucose 97 mg/dL   COD (ADULT)    Collection Time: 24  8:57 PM   Result Value Ref Range    ABO Grouping Only A     Rh Grouping Only NEG     Antibody Screen-Cod NEG    EKG (NOW)    Collection Time: 24  9:25 PM   Result Value Ref Range    Report       [facility]    Test Date:  2024  Pt Name:    CHEVY BONILLA               Department: ER  MRN:        [id number]                      Room:       [facility]  Gender:     Female                       Technician: [id number]  :        [id number]                   Requested By:PAULA ADRIAN  Order #:    [id number]                    Carissa MD: Paula  Maxx BOONE    Measurements  Intervals                                Axis  Rate:       73                           P:          42  GA:         141                          QRS:        25  QRSD:       83                           T:          42  QT:         388  QTc:        428    Interpretive Statements  Sinus rhythm  Left ventricular hypertrophy  No ST elevations  Electronically Signed On 11- 22:16:27 PST by Dickson Lilly MD     ABO Rh Confirm    Collection Time: 11/29/24 11:08 PM   Result Value Ref Range    ABO Rh Confirm A NEG    Lipid Profile    Collection Time: 11/30/24  2:24 AM   Result Value Ref Range    Cholesterol,Tot 203 (H) 100 - 199 mg/dL    Triglycerides 93 0 - 149 mg/dL    HDL 51 >=40 mg/dL     (H) <100 mg/dL       Imaging  MR-BRAIN-W/O   Final Result      1.  Several punctate acute to subacute left temporal and parietal lobe infarcts.   2.  Slow flow or occlusion of the left internal carotid artery below the level of the clinoid process. There is reconstitution at the level of the [place].   3.  Mild diffuse cerebral and cerebellar substance loss.   4.  Mild microangiopathic ischemic change versus demyelination or gliosis.      DX-CHEST-PORTABLE (1 VIEW)   Final Result      Interstitial pulmonary edema      CT-CTA NECK WITH & W/O-POST PROCESSING   Final Result      Occlusion or subtotal occlusion of the proximal LEFT internal carotid artery with estimated 50% stenosis of the petrous portion of the LEFT internal carotid artery      CT-CTA HEAD WITH & W/O-POST PROCESS   Final Result      CT angiogram of the [place] within normal limits.         CT-CEREBRAL PERFUSION ANALYSIS   Final Result      1. Cerebral blood flow less than 30% possibly representing completed infarct = 0 mL.      2. T Max more than 6 seconds possibly representing combination of completed infarct and ischemia = 0 mL.      3. Mismatched volume possibly representing ischemic brain/penumbra= 0 mL     "  4.  Please note that this cerebral perfusion study and report is Quantitative and targets supratentorial (cerebral) perfusion for evaluation of large vessel territory acute ischemia/infarction. For example, lacunar infarcts, and brainstem/posterior fossa    ischemia/infarction are not evaluated on this study.  Data acquisition is subject to artifacts which can yield non-anatomically plausible perfusion maps which may be due to motion, bolus timing, signal to noise ratio, or other technical factors.    Perfusion map abnormalities which show non-anatomic distributions are likely artifact.   This study is not \"stand-alone\" and should only be utilized for diagnosis, management/treatment in correlation with CT, CTA, and/or MRI and clinical factors.         CT-HEAD W/O   Final Result      1.  No acute intracranial abnormality.   2.  Small areas of encephalomalacia in the cortex of the LEFT frontoparietal vertex   3.  Senescent changes            EC-ECHOCARDIOGRAM COMPLETE W/O CONT    (Results Pending)   IR-CERV CAROTID STENT WITH PROTECTION    (Results Pending)       Assessment/Plan  * Carotid stenosis, left- (present on admission)  Assessment & Plan  Left ICA stenosis  Nonfocal exam at the moment.   Plan to have left ICA stent placement tomorrow morning  Keep blood pressure 160-200  Pt received amlodipine 10mg last night and this has been discontinued today  Brilinta load 180mg x1 today then 90mg BID  ASA 81 daily  Neurocheck Q2H  Will start low-dose norepinephrine to actively titrate and reach goal -200 if SBP <160    Acute CVA (cerebrovascular accident) (HCC)- (present on admission)  Assessment & Plan  Embolic stroke due to left ICA stenosis        Discussed patient condition and risk of morbidity and/or mortality with RN, RT, Pharmacy, and Patient.    The patient remains critically ill.  Critical care time = 35 minutes in directly providing and coordinating critical care and extensive data review.  No time " overlap and excludes procedures.

## 2024-11-30 NOTE — ED NOTES
Per eva Hathaway for RN to perform bedside swallow evaluation. Bedside swallow evaluation performed and patient passed.

## 2024-11-30 NOTE — PROGRESS NOTES
Hospital Medicine Daily Progress Note    Date of Service  11/30/2024    Chief Complaint  Leatha Desai is a 68 y.o. female admitted 11/29/2024 with right-sided weakness.    Hospital Course  Leatha Desai is a 68-year-old female with unremarkable PMHx as she has not seen a physician in decades.  Admitted 11/29 for right sided weakness and right facial droop.    Per history-patient noted progressive weakness starting in her right lower extremity up to her right upper extremity.  It was associated with a right-sided facial droop.  Symptoms lasted for approximately 30 minutes and resolved.  Similar symptoms that occurred approximately 24 hours prior.    In the emergency room-CT head showing small areas of encephalomalacia of the cortex and left frontoparietal vertex.  CTA neck with subtotal occlusion of left proximal internal carotid artery.  CTA head normal.    MRI brain is pending.    Interval Problem Update  11/30: Vitals notable for SBP ranging 146-224.  MRI brain showing several acute and subacute left temporal and parietal lobe infarcts.  Slow flow/occlusion of left ICA.  Discussed with neurology-Dr. Pepper.  Planning for transfer to ICU for every 2 hours neurochecks and SBP goal 160-200.     I have discussed this patient's plan of care and discharge plan at IDT rounds today with Case Management, Nursing, Nursing leadership, and other members of the IDT team.    Consultants/Specialty  None at this time; low threshold for neurology consult      Code Status  Full Code    Disposition  The patient is not medically cleared for discharge to home or a post-acute facility.      I have placed the appropriate orders for post-discharge needs.    Review of Systems  Review of Systems   Constitutional:  Negative for fever and malaise/fatigue.   Respiratory:  Negative for shortness of breath.    Cardiovascular:  Negative for chest pain and leg swelling.   Gastrointestinal:  Negative for abdominal pain, nausea and  vomiting.        Physical Exam  Temp:  [36.1 °C (97 °F)-36.5 °C (97.7 °F)] 36.4 °C (97.5 °F)  Pulse:  [] 76  Resp:  [13-19] 16  BP: (111-224)/() 148/96  SpO2:  [96 %-99 %] 97 %    Physical Exam  Vitals and nursing note reviewed.   Constitutional:       General: She is not in acute distress.     Appearance: Normal appearance. She is not ill-appearing.   Cardiovascular:      Rate and Rhythm: Normal rate and regular rhythm.   Pulmonary:      Effort: Pulmonary effort is normal.      Breath sounds: Normal breath sounds.   Skin:     General: Skin is warm and dry.   Neurological:      Mental Status: She is alert and oriented to person, place, and time.   Psychiatric:         Mood and Affect: Mood normal.         Behavior: Behavior normal.         Fluids    Intake/Output Summary (Last 24 hours) at 11/30/2024 1435  Last data filed at 11/30/2024 0757  Gross per 24 hour   Intake 638 ml   Output --   Net 638 ml        Laboratory  Recent Labs     11/29/24 2027   WBC 7.9   RBC 4.79   HEMOGLOBIN 15.9   HEMATOCRIT 46.3   MCV 96.7   MCH 33.2*   MCHC 34.3   RDW 44.0   PLATELETCT 181   MPV 13.3*     Recent Labs     11/29/24 2027   SODIUM 136   POTASSIUM 4.0   CHLORIDE 101   CO2 21   GLUCOSE 102*   BUN 15   CREATININE 0.99   CALCIUM 9.9     Recent Labs     11/29/24 2027   APTT 27.6   INR 0.95         Recent Labs     11/30/24  0224   TRIGLYCERIDE 93   HDL 51   *       Imaging  MR-BRAIN-W/O   Final Result      1.  Several punctate acute to subacute left temporal and parietal lobe infarcts.   2.  Slow flow or occlusion of the left internal carotid artery below the level of the clinoid process. There is reconstitution at the level of the [place].   3.  Mild diffuse cerebral and cerebellar substance loss.   4.  Mild microangiopathic ischemic change versus demyelination or gliosis.      DX-CHEST-PORTABLE (1 VIEW)   Final Result      Interstitial pulmonary edema      CT-CTA NECK WITH & W/O-POST PROCESSING  "  Final Result      Occlusion or subtotal occlusion of the proximal LEFT internal carotid artery with estimated 50% stenosis of the petrous portion of the LEFT internal carotid artery      CT-CTA HEAD WITH & W/O-POST PROCESS   Final Result      CT angiogram of the [place] within normal limits.         CT-CEREBRAL PERFUSION ANALYSIS   Final Result      1. Cerebral blood flow less than 30% possibly representing completed infarct = 0 mL.      2. T Max more than 6 seconds possibly representing combination of completed infarct and ischemia = 0 mL.      3. Mismatched volume possibly representing ischemic brain/penumbra= 0 mL      4.  Please note that this cerebral perfusion study and report is Quantitative and targets supratentorial (cerebral) perfusion for evaluation of large vessel territory acute ischemia/infarction. For example, lacunar infarcts, and brainstem/posterior fossa    ischemia/infarction are not evaluated on this study.  Data acquisition is subject to artifacts which can yield non-anatomically plausible perfusion maps which may be due to motion, bolus timing, signal to noise ratio, or other technical factors.    Perfusion map abnormalities which show non-anatomic distributions are likely artifact.   This study is not \"stand-alone\" and should only be utilized for diagnosis, management/treatment in correlation with CT, CTA, and/or MRI and clinical factors.         CT-HEAD W/O   Final Result      1.  No acute intracranial abnormality.   2.  Small areas of encephalomalacia in the cortex of the LEFT frontoparietal vertex   3.  Senescent changes            EC-ECHOCARDIOGRAM COMPLETE W/O CONT    (Results Pending)   IR-CERV CAROTID STENT WITH PROTECTION    (Results Pending)        Assessment/Plan  * Acute CVA (cerebrovascular accident) (HCC)- (present on admission)  Assessment & Plan  MRI brain: 1.  Several punctate acute to subacute left temporal and parietal lobe infarcts.  2.  Slow flow or occlusion of " the left internal carotid artery below the level of the clinoid process. There is reconstitution at the level of the [place].  - Neurology consult placed today; discussed with Dr. Pepper  - Allow for forced hypertension SBP goal 160 through 200  - Continue ASA and atorvastatin  - Loading dose with Brilinta    ICAO (internal carotid artery occlusion), left  Assessment & Plan  Slow flow/occlusion to left ICA  - Neurovascular consult is pending  - Plan for stent placement tomorrow  - N.p.o. at midnight  - Loading dose of Brilinta  - Forced hypertension with SBP goal 160 through 200  - Transfer to ICU for every 2 hours neurochecks and forced hypertension with possible pressor support  - Hold p.o. amlodipine       Patient is critically ill.   The patient continues to have: Left ICA occlusion  The vital organ system that is affected is the: Neurovascular system  If untreated there is a high chance of deterioration into: Acute stroke, worsening cerebrovascular disease, respiratory failure and eventually death.   The critical care that I am providing today is: Transfer to ICU for forced hypertension with an SBP goal 160-200  The critical that has been undertaken is medically complex.   There has been no overlap in critical care time.   Critical Care Time not including procedures: 58 minutes      VTE prophylaxis:   SCDs/TEDs      I have performed a physical exam and reviewed and updated ROS and Plan today (11/30/2024). In review of yesterday's note (11/29/2024), there are no changes except as documented above.

## 2024-11-30 NOTE — PROGRESS NOTES
Pt requested daughter be notified of situation as her phone . Pt phone plugged in at charge desk and attempted to call daughter Elvia to update. Elvia's voicemail is not set up, unable to leave message.

## 2024-11-30 NOTE — PROGRESS NOTES
4 Eyes Skin Assessment Completed by Anushka MCCORD RN and KATHLEEN Brown.    Head WDL  Ears WDL  Nose WDL  Mouth WDL  Neck WDL  Breast/Chest WDL slight redness under right breast  Shoulder Blades WDL Bruise to right shoulder  Spine WDL  (R) Arm/Elbow/Hand Bruising generalized bruising arm/elbow/wrist (elbow slow to diony)  (L) Arm/Elbow/Hand WDL  Abdomen WDL    Groin WDL  Scrotum/Coccyx/Buttocks WDL   (R) Leg WDL  (L) Leg WDL  (R) Heel/Foot/Toe WDL  (L) Heel/Foot/Toe WDL          Devices In Places Tele Box, Blood Pressure Cuff, and Pulse Ox      Interventions In Place Pillows    Possible Skin Injury No    Pictures Uploaded Into Epic N/A  Wound Consult Placed N/A  RN Wound Prevention Protocol Ordered No

## 2024-11-30 NOTE — ED NOTES
Report given to receiving RN. Patient transported to T721-1 with tele 7 tech. All belongings in possession left with patient. A&Ox4.

## 2024-11-30 NOTE — PROGRESS NOTES
4 Eyes Skin Assessment Completed by MILO Peterson and MILO Duarte.    Head WDL  Ears WDL  Nose WDL  Mouth WDL  Neck WDL  Breast/Chest WDL  Shoulder Blades WDL  Spine WDL  (R) Arm/Elbow/Hand Bruising  (L) Arm/Elbow/Hand WDL  Abdomen WDL  Groin WDL  Scrotum/Coccyx/Buttocks WDL  (R) Leg WDL  (L) Leg WDL  (R) Heel/Foot/Toe Redness and Blanching  (L) Heel/Foot/Toe Redness and Blanching          Devices In Places ECG, Blood Pressure Cuff, and Pulse Ox      Interventions In Place Pillows and Heels Loaded W/Pillows    Possible Skin Injury No    Pictures Uploaded Into Epic N/A  Wound Consult Placed N/A  RN Wound Prevention Protocol Ordered No

## 2024-11-30 NOTE — CARE PLAN
The patient is Watcher - Medium risk of patient condition declining or worsening    Shift Goals  Clinical Goals: MRI, SBP/VSS, Safety  Patient Goals: Sleep  Family Goals: CATY    Progress made toward(s) clinical / shift goals:      Problem: Optimal Care of the Stroke Patient  Goal: Optimal acute care for the stroke patient  Outcome: Progressing  Note: Vitals signs and neuro checks completed per order.  Patient is ambulatory and refuses SCD's.  Dysphagia screen completed.      Problem: Knowledge Deficit - Stroke Education  Goal: Patient's knowledge of stroke and risk factors will improve  Outcome: Progressing  Note: Discussed POC, medications, diagnostics, labs, treatments with patient who demonstrated understanding with no additional questions  Patient verbalized understanding of TIA and impact HTN has on risk.      Problem: Neuro Status  Goal: Neuro status will remain stable or improve  Outcome: Progressing  Note: Neuro checks as ordered.      Problem: Mobility - Stroke  Goal: Patient's capacity to carry out activities will improve  Outcome: Progressing  Patient has no deficits, no fall risk, able to perform ADL's       Patient is not progressing towards the following goals:

## 2024-11-30 NOTE — PROGRESS NOTES
Report given to Kristen LLAMAS RN at bedside. Pt transported via wheelchair with all belongings in place. Monitor room notified.

## 2024-11-30 NOTE — ED TRIAGE NOTES
Chief Complaint   Patient presents with    Numbness     Pt reports right foot cramping approx 1 hour prior that radiated up her right leg and turned into numbness. Pt states the numbness then traveled up her right arm and she felt as though she cannot speak. Pt says that she feels some numbness in her RUE but leg numbness has subsided. -thinners/ASA    Wheeled to triage for above complaint with family.     Pt reports feeling cramping in her right foot approx 1 hour prior when she felt the cramping turn into numbness and travel up her right leg. States she then felt numbness to her RUE. Denies facial numbness but states she felt as though she was unable to speak. Denies LOC or confusion, states she was able to understand her family when she attempted to speak. Pt speaking in full sentences, states that most of the symptoms have resolved but she feels some numbness to her RUE. -SOB/CP    Stroke scale completed in triage, +numbness to RUE    Charge RN notified, pt stroke assessment to charge desk.    Patient and staff wearing appropriate PPE.

## 2024-11-30 NOTE — ED NOTES
Medication history reviewed with patient at bedside.   Med rec is complete,  Allergies reviewed.     Patient has not had any outpatient antibiotics in the last 30 days.   Anticoagulants: No     Nessa Camacho

## 2024-11-30 NOTE — ASSESSMENT & PLAN NOTE
Embolic stroke due to left ICA stenosis  
Left ICA stenosis  Nonfocal exam at the moment.   Plan to have left ICA stent placement tomorrow morning  Keep blood pressure 160-200  Pt received amlodipine 10mg last night and this has been discontinued today  Brilinta load 180mg x1 today then 90mg BID  ASA 81 daily  Neurocheck Q2H  Will start low-dose norepinephrine to actively titrate and reach goal -200 if SBP <160  
MRI brain: 1.  Several punctate acute to subacute left temporal and parietal lobe infarcts.  2.  Slow flow or occlusion of the left internal carotid artery below the level of the clinoid process. There is reconstitution at the level of the [place].  - Neurology consult placed today; discussed with Dr. Pepper  - Allow for forced hypertension SBP goal 160 through 200  - Continue ASA and atorvastatin  - Loading dose with Brilinta  
Slow flow/occlusion to left ICA  - Neurovascular consult is pending  - Plan for stent placement tomorrow  - N.p.o. at midnight  - Loading dose of Brilinta  - Forced hypertension with SBP goal 160 through 200  - Transfer to ICU for every 2 hours neurochecks and forced hypertension with possible pressor support  - Hold p.o. amlodipine  
170

## 2024-12-01 ENCOUNTER — APPOINTMENT (OUTPATIENT)
Dept: CARDIOLOGY | Facility: MEDICAL CENTER | Age: 69
DRG: 036 | End: 2024-12-01
Attending: STUDENT IN AN ORGANIZED HEALTH CARE EDUCATION/TRAINING PROGRAM
Payer: COMMERCIAL

## 2024-12-01 ENCOUNTER — APPOINTMENT (OUTPATIENT)
Dept: RADIOLOGY | Facility: MEDICAL CENTER | Age: 69
DRG: 036 | End: 2024-12-01
Attending: PSYCHIATRY & NEUROLOGY
Payer: COMMERCIAL

## 2024-12-01 LAB
ANION GAP SERPL CALC-SCNC: 11 MMOL/L (ref 7–16)
BUN SERPL-MCNC: 8 MG/DL (ref 8–22)
CALCIUM SERPL-MCNC: 9.6 MG/DL (ref 8.5–10.5)
CHLORIDE SERPL-SCNC: 107 MMOL/L (ref 96–112)
CO2 SERPL-SCNC: 22 MMOL/L (ref 20–33)
CREAT SERPL-MCNC: 0.92 MG/DL (ref 0.5–1.4)
ERYTHROCYTE [DISTWIDTH] IN BLOOD BY AUTOMATED COUNT: 44.8 FL (ref 35.9–50)
GFR SERPLBLD CREATININE-BSD FMLA CKD-EPI: 68 ML/MIN/1.73 M 2
GLUCOSE SERPL-MCNC: 88 MG/DL (ref 65–99)
HCT VFR BLD AUTO: 44.6 % (ref 37–47)
HGB BLD-MCNC: 14.9 G/DL (ref 12–16)
LV EJECT FRACT  99904: 66
LV EJECT FRACT MOD 2C 99903: 60.26
LV EJECT FRACT MOD 4C 99902: 67.72
LV EJECT FRACT MOD BP 99901: 66.06
MCH RBC QN AUTO: 32.6 PG (ref 27–33)
MCHC RBC AUTO-ENTMCNC: 33.4 G/DL (ref 32.2–35.5)
MCV RBC AUTO: 97.6 FL (ref 81.4–97.8)
PLATELET # BLD AUTO: 168 K/UL (ref 164–446)
PMV BLD AUTO: 13.5 FL (ref 9–12.9)
POTASSIUM SERPL-SCNC: 4.4 MMOL/L (ref 3.6–5.5)
RBC # BLD AUTO: 4.57 M/UL (ref 4.2–5.4)
SODIUM SERPL-SCNC: 140 MMOL/L (ref 135–145)
WBC # BLD AUTO: 6.4 K/UL (ref 4.8–10.8)

## 2024-12-01 PROCEDURE — 037L3DZ DILATION OF LEFT INTERNAL CAROTID ARTERY WITH INTRALUMINAL DEVICE, PERCUTANEOUS APPROACH: ICD-10-PCS | Performed by: RADIOLOGY

## 2024-12-01 PROCEDURE — 85027 COMPLETE CBC AUTOMATED: CPT

## 2024-12-01 PROCEDURE — 770022 HCHG ROOM/CARE - ICU (200)

## 2024-12-01 PROCEDURE — 700101 HCHG RX REV CODE 250: Performed by: RADIOLOGY

## 2024-12-01 PROCEDURE — 700111 HCHG RX REV CODE 636 W/ 250 OVERRIDE (IP): Performed by: RADIOLOGY

## 2024-12-01 PROCEDURE — 93306 TTE W/DOPPLER COMPLETE: CPT

## 2024-12-01 PROCEDURE — 700111 HCHG RX REV CODE 636 W/ 250 OVERRIDE (IP)

## 2024-12-01 PROCEDURE — A9270 NON-COVERED ITEM OR SERVICE: HCPCS | Performed by: INTERNAL MEDICINE

## 2024-12-01 PROCEDURE — A9270 NON-COVERED ITEM OR SERVICE: HCPCS | Performed by: PSYCHIATRY & NEUROLOGY

## 2024-12-01 PROCEDURE — 99291 CRITICAL CARE FIRST HOUR: CPT

## 2024-12-01 PROCEDURE — 700101 HCHG RX REV CODE 250

## 2024-12-01 PROCEDURE — 700102 HCHG RX REV CODE 250 W/ 637 OVERRIDE(OP): Performed by: STUDENT IN AN ORGANIZED HEALTH CARE EDUCATION/TRAINING PROGRAM

## 2024-12-01 PROCEDURE — 700102 HCHG RX REV CODE 250 W/ 637 OVERRIDE(OP): Performed by: PSYCHIATRY & NEUROLOGY

## 2024-12-01 PROCEDURE — A9270 NON-COVERED ITEM OR SERVICE: HCPCS | Performed by: STUDENT IN AN ORGANIZED HEALTH CARE EDUCATION/TRAINING PROGRAM

## 2024-12-01 PROCEDURE — 80048 BASIC METABOLIC PNL TOTAL CA: CPT

## 2024-12-01 PROCEDURE — 700111 HCHG RX REV CODE 636 W/ 250 OVERRIDE (IP): Mod: JZ | Performed by: STUDENT IN AN ORGANIZED HEALTH CARE EDUCATION/TRAINING PROGRAM

## 2024-12-01 PROCEDURE — 700102 HCHG RX REV CODE 250 W/ 637 OVERRIDE(OP): Performed by: INTERNAL MEDICINE

## 2024-12-01 PROCEDURE — 700117 HCHG RX CONTRAST REV CODE 255: Performed by: RADIOLOGY

## 2024-12-01 PROCEDURE — 99153 MOD SED SAME PHYS/QHP EA: CPT

## 2024-12-01 PROCEDURE — 93306 TTE W/DOPPLER COMPLETE: CPT | Mod: 26 | Performed by: INTERNAL MEDICINE

## 2024-12-01 PROCEDURE — 99232 SBSQ HOSP IP/OBS MODERATE 35: CPT | Performed by: PSYCHIATRY & NEUROLOGY

## 2024-12-01 PROCEDURE — 700111 HCHG RX REV CODE 636 W/ 250 OVERRIDE (IP): Mod: JZ

## 2024-12-01 RX ORDER — ONDANSETRON 2 MG/ML
4 INJECTION INTRAMUSCULAR; INTRAVENOUS PRN
Status: ACTIVE | OUTPATIENT
Start: 2024-12-01 | End: 2024-12-01

## 2024-12-01 RX ORDER — PHENYLEPHRINE HCL IN 0.9% NACL 1 MG/10 ML
SYRINGE (ML) INTRAVENOUS
Status: DISPENSED
Start: 2024-12-01 | End: 2024-12-01

## 2024-12-01 RX ORDER — HEPARIN SODIUM 1000 [USP'U]/ML
INJECTION, SOLUTION INTRAVENOUS; SUBCUTANEOUS
Status: DISPENSED
Start: 2024-12-01 | End: 2024-12-01

## 2024-12-01 RX ORDER — PROTAMINE SULFATE 10 MG/ML
INJECTION, SOLUTION INTRAVENOUS
Status: COMPLETED | OUTPATIENT
Start: 2024-12-01 | End: 2024-12-01

## 2024-12-01 RX ORDER — PHENYLEPHRINE HCL IN 0.9% NACL 1 MG/10 ML
SYRINGE (ML) INTRAVENOUS
Status: COMPLETED | OUTPATIENT
Start: 2024-12-01 | End: 2024-12-01

## 2024-12-01 RX ORDER — LISINOPRIL 20 MG/1
20 TABLET ORAL
Status: DISCONTINUED | OUTPATIENT
Start: 2024-12-01 | End: 2024-12-02

## 2024-12-01 RX ORDER — PHENYLEPHRINE HYDROCHLORIDE 10 MG/ML
INJECTION, SOLUTION INTRAMUSCULAR; INTRAVENOUS; SUBCUTANEOUS
Status: DISPENSED
Start: 2024-12-01 | End: 2024-12-01

## 2024-12-01 RX ORDER — ATROPINE SULFATE 0.4 MG/ML
INJECTION, SOLUTION INTRAVENOUS
Status: DISPENSED
Start: 2024-12-01 | End: 2024-12-01

## 2024-12-01 RX ORDER — MIDAZOLAM HYDROCHLORIDE 1 MG/ML
.5-2 INJECTION INTRAMUSCULAR; INTRAVENOUS PRN
Status: ACTIVE | OUTPATIENT
Start: 2024-12-01 | End: 2024-12-01

## 2024-12-01 RX ORDER — LABETALOL HYDROCHLORIDE 5 MG/ML
10-20 INJECTION, SOLUTION INTRAVENOUS EVERY 4 HOURS PRN
Status: DISCONTINUED | OUTPATIENT
Start: 2024-12-01 | End: 2024-12-02 | Stop reason: HOSPADM

## 2024-12-01 RX ORDER — SODIUM CHLORIDE 9 MG/ML
500 INJECTION, SOLUTION INTRAVENOUS
Status: ACTIVE | OUTPATIENT
Start: 2024-12-01 | End: 2024-12-01

## 2024-12-01 RX ORDER — ONDANSETRON 2 MG/ML
INJECTION INTRAMUSCULAR; INTRAVENOUS
Status: DISPENSED
Start: 2024-12-01 | End: 2024-12-01

## 2024-12-01 RX ORDER — HYDRALAZINE HYDROCHLORIDE 20 MG/ML
10-20 INJECTION INTRAMUSCULAR; INTRAVENOUS EVERY 4 HOURS PRN
Status: DISCONTINUED | OUTPATIENT
Start: 2024-12-01 | End: 2024-12-02 | Stop reason: HOSPADM

## 2024-12-01 RX ORDER — NICARDIPINE HYDROCHLORIDE 0.1 MG/ML
0-15 INJECTION INTRAVENOUS CONTINUOUS
Status: DISCONTINUED | OUTPATIENT
Start: 2024-12-01 | End: 2024-12-01

## 2024-12-01 RX ORDER — PROTAMINE SULFATE 10 MG/ML
INJECTION, SOLUTION INTRAVENOUS
Status: DISPENSED
Start: 2024-12-01 | End: 2024-12-01

## 2024-12-01 RX ORDER — MIDAZOLAM HYDROCHLORIDE 1 MG/ML
INJECTION INTRAMUSCULAR; INTRAVENOUS
Status: COMPLETED
Start: 2024-12-01 | End: 2024-12-01

## 2024-12-01 RX ORDER — NICARDIPINE HYDROCHLORIDE 0.1 MG/ML
0-15 INJECTION INTRAVENOUS CONTINUOUS
Status: DISCONTINUED | OUTPATIENT
Start: 2024-12-01 | End: 2024-12-02

## 2024-12-01 RX ADMIN — ASPIRIN 81 MG: 81 TABLET, CHEWABLE ORAL at 05:55

## 2024-12-01 RX ADMIN — FENTANYL CITRATE 50 MCG: 50 INJECTION, SOLUTION INTRAMUSCULAR; INTRAVENOUS at 10:50

## 2024-12-01 RX ADMIN — NICARDIPINE HYDROCHLORIDE 7.5 MG/HR: 0.1 INJECTION INTRAVENOUS at 15:42

## 2024-12-01 RX ADMIN — MIDAZOLAM HYDROCHLORIDE 2 MG: 1 INJECTION, SOLUTION INTRAMUSCULAR; INTRAVENOUS at 10:50

## 2024-12-01 RX ADMIN — ACETAMINOPHEN 650 MG: 325 TABLET ORAL at 14:54

## 2024-12-01 RX ADMIN — HYDRALAZINE HYDROCHLORIDE 10 MG: 20 INJECTION, SOLUTION INTRAMUSCULAR; INTRAVENOUS at 17:42

## 2024-12-01 RX ADMIN — NOREPINEPHRINE BITARTRATE 0.05 MCG/KG/MIN: 0.03 INJECTION, SOLUTION INTRAVENOUS at 19:05

## 2024-12-01 RX ADMIN — ATORVASTATIN CALCIUM 40 MG: 40 TABLET, FILM COATED ORAL at 17:07

## 2024-12-01 RX ADMIN — NICARDIPINE HYDROCHLORIDE 2.5 MG/HR: 0.1 INJECTION INTRAVENOUS at 12:27

## 2024-12-01 RX ADMIN — PROTAMINE SULFATE 25 MG: 10 INJECTION, SOLUTION INTRAVENOUS at 11:35

## 2024-12-01 RX ADMIN — IOHEXOL 55 ML: 300 INJECTION, SOLUTION INTRAVENOUS at 12:30

## 2024-12-01 RX ADMIN — LISINOPRIL 20 MG: 20 TABLET ORAL at 15:44

## 2024-12-01 RX ADMIN — MIDAZOLAM HYDROCHLORIDE 2 MG: 1 INJECTION, SOLUTION INTRAMUSCULAR; INTRAVENOUS at 10:40

## 2024-12-01 RX ADMIN — ONDANSETRON 4 MG: 2 INJECTION INTRAMUSCULAR; INTRAVENOUS at 18:48

## 2024-12-01 RX ADMIN — TICAGRELOR 90 MG: 90 TABLET ORAL at 17:07

## 2024-12-01 RX ADMIN — TICAGRELOR 90 MG: 90 TABLET ORAL at 05:55

## 2024-12-01 RX ADMIN — Medication 1 ML: at 11:50

## 2024-12-01 ASSESSMENT — ENCOUNTER SYMPTOMS
HEADACHES: 0
VOMITING: 0
WEAKNESS: 0
NERVOUS/ANXIOUS: 0
FEVER: 0
BACK PAIN: 0
DIARRHEA: 0
NAUSEA: 0
SHORTNESS OF BREATH: 0
ABDOMINAL PAIN: 0

## 2024-12-01 ASSESSMENT — PAIN DESCRIPTION - PAIN TYPE
TYPE: ACUTE PAIN

## 2024-12-01 NOTE — CARE PLAN
The patient is Watcher - Medium risk of patient condition declining or worsening    Shift Goals  Clinical Goals: Q2h neuros, -200  Patient Goals: rest  Family Goals: updates    Progress made toward(s) clinical / shift goals:      Pt is neurologically intact with no new findings and reports no pain. Pt on norepinephrine infusion to maintain a blood pressure range of 160-200 mmhg.Pt able to rest, with care bundled to promote uninterrupted sleep. The family is not currently at bedside for updates. Pt has requested that her  be contacted after her procedure.    Problem: Neuro Status  Goal: Neuro status will remain stable or improve  Description: Target End Date:  Prior to discharge or change in level of care    Document on Neuro assessment in the Assessment flowsheet    1.  Assess and monitor neurologic status per provider order/protocol/unit policy  2.  Assess level of consciousness and orientation  3.  Assess for speech, dysarthria, dysphagia, facial symmetry  4.  Assess visual field, eye movements, gaze preference, pupil reaction and size  5.  Assess muscle strength and motor response in all four extremities  6.  Assess for sensation (numbness and tingling)  7.  Assess basic neuro reflexes (cough, gag, corneal)  8.  Identify changes in neuro status and report to provider for testing/treatment orders  Outcome: Progressing     Problem: Hemodynamic Monitoring  Goal: Patient's hemodynamics, fluid balance and neurologic status will be stable or improve  Description: Target End Date:  Prior to discharge or change in level of care    1.  Vital signs, pulse oximetry and cardiac monitor per provider order and/or policy  2.  Frequent pulse checks performed post thrombectomy  3.  Frequent monitoring for signs of bleeding post TPA administration  4.  Proper management of IV infusions  5.  Intake and output monitored per provider order  6.  Daily weight obtained per unit policy or provider order  7.  Peripheral  pulses and capillary refill assessed as needed  8.  Monitor for signs/symptoms of excessive bleeding  9.  Body temperature assessed and fevers treated  10. Patient positioned for maximum circulation/cardiac output  Outcome: Progressing     Problem: Knowledge Deficit - Stroke Education  Goal: Patient's knowledge of stroke and risk factors will improve  Description: Target End Date:  1-3 days or as soon as patient condition allows    Document in Patient Education    1.  Stroke education booklet provided  2.  Education regarding EMS activation, need for follow up, medication prescribed at discharge, risk factors for stroke/lifestyle modifications, warning signs and symptoms of stroke provided  Outcome: Progressing       Patient is not progressing towards the following goals:n/a

## 2024-12-01 NOTE — PROGRESS NOTES
Neurology Progress Note  Neurohospitalist Service, [facility]    Referring Physician: Hero Castillo D.O.    Chief Complaint   Patient presents with    Numbness     Pt reports right foot cramping approx 1 hour prior that radiated up her right leg and turned into numbness. Pt states the numbness then traveled up her right arm and she felt as though she cannot speak. Pt says that she feels some numbness in her RUE but leg numbness has subsided. -thinners/ASA       HPI: Refer to initial documented Neurology H&P, as detailed in the patient's chart.    Interval History 12/1: No issues today.    Review of systems: In addition to what is detailed in the HPI and/or updated in the interval history, all other systems reviewed and are negative.    Past Medical History:    has no past medical history on file.    FHx:  family history is not on file.    SHx:   reports that she has never smoked. She has never used smokeless tobacco. She reports that she does not currently use alcohol. She reports that she does not use drugs.    Medications:    Current Facility-Administered Medications:     ticagrelor (Brilinta) tablet 90 mg, 90 mg, Oral, BID, Mike Pepper M.D., 90 mg at 12/01/24 0555    norepinephrine (Levophed) 8 mg in 250 mL NS infusion (premix), 0-1 mcg/kg/min (Ideal), Intravenous, Continuous, Hero Castillo D.O., Stopped at 12/01/24 0819    aspirin (Asa) chewable tab 81 mg, 81 mg, Oral, DAILY, Amaury Jennings M.D., 81 mg at 12/01/24 0555    atorvastatin (Lipitor) tablet 40 mg, 40 mg, Oral, Q EVENING, Amaury Jennings M.D., 40 mg at 11/30/24 1813    acetaminophen (Tylenol) tablet 650 mg, 650 mg, Oral, Q6HRS PRN, Amaury Jennings M.D.    ondansetron (Zofran) syringe/vial injection 4 mg, 4 mg, Intravenous, Q4HRS PRN, Amaury Jennings M.D.    Physical Examination:     Vitals:    12/01/24 0735 12/01/24 0745 12/01/24 0800 12/01/24 0815   BP: (!) 198/84 (!) 170/73 (!) 199/81 (!) 210/92   Pulse: (!) 57  61 61 65   Resp:       Temp:   35.8 °C (96.5 °F)    TempSrc:   Temporal    SpO2: 99%      Weight:       Height:             NEUROLOGICAL EXAM:     Awake alert and orient x 4.  Able to repeat, named objects, speech intact.  Pupils equal reactive.  Face symmetrical.  Visual field intact.  Hearing intact.  Tongue looks like.  Sustained antigravity in all 4.  Sensation intact.  Downgoing toes.    Objective Data:    Labs:  Lab Results   Component Value Date/Time    PROTHROMBTM 12.7 11/29/2024 08:27 PM    INR 0.95 11/29/2024 08:27 PM      Lab Results   Component Value Date/Time    WBC 6.4 12/01/2024 04:15 AM    RBC 4.57 12/01/2024 04:15 AM    HEMOGLOBIN 14.9 12/01/2024 04:15 AM    HEMATOCRIT 44.6 12/01/2024 04:15 AM    MCV 97.6 12/01/2024 04:15 AM    MCH 32.6 12/01/2024 04:15 AM    MCHC 33.4 12/01/2024 04:15 AM    MPV 13.5 (H) 12/01/2024 04:15 AM    NEUTSPOLYS 43.00 (L) 11/29/2024 08:27 PM    LYMPHOCYTES 45.20 (H) 11/29/2024 08:27 PM    MONOCYTES 8.60 11/29/2024 08:27 PM    EOSINOPHILS 2.40 11/29/2024 08:27 PM    BASOPHILS 0.50 11/29/2024 08:27 PM      Lab Results   Component Value Date/Time    SODIUM 136 11/29/2024 08:27 PM    POTASSIUM 4.0 11/29/2024 08:27 PM    CHLORIDE 101 11/29/2024 08:27 PM    CO2 21 11/29/2024 08:27 PM    GLUCOSE 102 (H) 11/29/2024 08:27 PM    BUN 15 11/29/2024 08:27 PM    CREATININE 0.99 11/29/2024 08:27 PM      Lab Results   Component Value Date/Time    CHOLSTRLTOT 203 (H) 11/30/2024 02:24 AM     (H) 11/30/2024 02:24 AM    HDL 51 11/30/2024 02:24 AM    TRIGLYCERIDE 93 11/30/2024 02:24 AM       Lab Results   Component Value Date/Time    ALKPHOSPHAT 96 11/29/2024 08:27 PM    ASTSGOT 28 11/29/2024 08:27 PM    ALTSGPT 28 11/29/2024 08:27 PM    TBILIRUBIN 0.2 11/29/2024 08:27 PM        Imaging/Testing:  EC-ECHOCARDIOGRAM COMPLETE W/O CONT         MR-BRAIN-W/O   Final Result      1.  Several punctate acute to subacute left temporal and parietal lobe infarcts.   2.  Slow flow or occlusion of the  "left internal carotid artery below the level of the clinoid process. There is reconstitution at the level of the [place].   3.  Mild diffuse cerebral and cerebellar substance loss.   4.  Mild microangiopathic ischemic change versus demyelination or gliosis.      DX-CHEST-PORTABLE (1 VIEW)   Final Result      Interstitial pulmonary edema      CT-CTA NECK WITH & W/O-POST PROCESSING   Final Result      Occlusion or subtotal occlusion of the proximal LEFT internal carotid artery with estimated 50% stenosis of the petrous portion of the LEFT internal carotid artery      CT-CTA HEAD WITH & W/O-POST PROCESS   Final Result      CT angiogram of the [place] within normal limits.         CT-CEREBRAL PERFUSION ANALYSIS   Final Result      1. Cerebral blood flow less than 30% possibly representing completed infarct = 0 mL.      2. T Max more than 6 seconds possibly representing combination of completed infarct and ischemia = 0 mL.      3. Mismatched volume possibly representing ischemic brain/penumbra= 0 mL      4.  Please note that this cerebral perfusion study and report is Quantitative and targets supratentorial (cerebral) perfusion for evaluation of large vessel territory acute ischemia/infarction. For example, lacunar infarcts, and brainstem/posterior fossa    ischemia/infarction are not evaluated on this study.  Data acquisition is subject to artifacts which can yield non-anatomically plausible perfusion maps which may be due to motion, bolus timing, signal to noise ratio, or other technical factors.    Perfusion map abnormalities which show non-anatomic distributions are likely artifact.   This study is not \"stand-alone\" and should only be utilized for diagnosis, management/treatment in correlation with CT, CTA, and/or MRI and clinical factors.         CT-HEAD W/O   Final Result      1.  No acute intracranial abnormality.   2.  Small areas of encephalomalacia in the cortex of the LEFT frontoparietal " vertex   3.  Senescent changes            IR-CERV CAROTID STENT WITH PROTECTION    (Results Pending)         Assessment and Plan:    68-year-old female unknown medical history does not see physicians regularly.  Presenting with left MCA syndrome stat completely resolved 2 days ago.  Found to have a critical left ICA stenosis just distal to the bifurcation.  Plan today is to undergo carotid stenting with neuro IR.  Patient will be monitored in the ICU after the procedure.  She will need to have normal blood pressures below 140 systolic after the procedure.  Currently keeping blood pressures above 160 until the procedure.  Continue with DAPT therapy with aspirin and Brilinta.    Plan:  1.  Continue with DAPT therapy aspirin 81 mg daily and Brilinta 90 mg twice daily.  2.  Maintain an LDL below 70 at this time.  3.  Before the stent placement keep the blood pressure between 160-200 systolic.  After stent placement keep the pressure between 100s to 140 systolic.  4.  Maintain normoglycemia.  5.  Vascular clinic follow-up.  6.  If the patient does well anticipate the patient most likely can be discharged tomorrow morning.      This chart was partially generated using voice recognition technology and sound alike word replacement may be present, best efforts were made to make the chart accurate.    Mike Pepper MD  Board Certified Neurology, ABPN  (t) [phone]

## 2024-12-01 NOTE — PROGRESS NOTES
Late entry:     Patient back from IR with IR RN at 1205. Groin site assessed, deep hematoma below groin site. Manual pressure applied. Pedal pulses palpable. Pt A&Ox4, HTN, nicardipine gtt initiated. Patient to be on bedrest for 2 hours.

## 2024-12-01 NOTE — PROGRESS NOTES
"Critical Care Progress Note    Date of admission  11/29/2024    Chief Complaint  68 y.o. female admitted 11/29/2024 with stroke like symptoms.    Hospital Course  \"68 y.o. female with no known medical history who was admitted on 11/29 after had a right side weakness and numbness and right facial droop on Thursday 11/28.  symptoms were completely resolved.  Yesterday she again had the symptoms and resolved fairly quickly.  Initial blood pressure at the ED was 224/103. CTA show critical stenosis of the left ICA at the bifurcation.  MRI of the brain shows small embolic appearing infarct over the left hemisphere.  Neurology was consulted and plan for left ICA stent placement tomorrow morning.  Recommend blood pressure goal to be 160-200.  Patient is loaded with Brilinta 180 mg now and follow-up with 90 mg twice a day.  Patient is on aspirin 81 mg daily     Pt also would like everyone to know that she has a flight to catch on Wednesday at 11.30am back to [place]. She lives in [place], [place] and visiting daughter here for Thanksgiving. She said she will not cancel the flight and would leave hospital AMA on Wednesday if she is still in hospital by then.\" - Dr Castillo H&P    11/30- Tx to ICU for BP control. SBP goal 160-200. Neuro IR consulted for left ICA stent placement  12/1- IR for left ICA stent placement. SBP goal 110-140    Interval Problem Update  Reviewed last 24 hour events:  - Overnight Events:  - NAONE   - Tm: AF  - Neuro: No acute neuro changes   - HR: 50-70   - SBP: 130-190, Goal 160-200   - UOP: voids in bathroom   - Young: n   - GI: npo  - Lines: piv   - PPx: GI none, DVT none   - Mobility level 1    Infusions:   -levo    ABX:   -none    Review of Systems  Review of Systems   Constitutional:  Negative for fever and malaise/fatigue.   Respiratory:  Negative for shortness of breath.    Cardiovascular:  Negative for chest pain and leg swelling.   Gastrointestinal:  Negative for abdominal pain, diarrhea, nausea and " vomiting.   Musculoskeletal:  Negative for back pain.   Neurological:  Negative for weakness and headaches.   Psychiatric/Behavioral:  The patient is not nervous/anxious.    All other systems reviewed and are negative.       Vital Signs for last 24 hours   Temp:  [35.8 °C (96.5 °F)-36.8 °C (98.2 °F)] 35.8 °C (96.5 °F)  Pulse:  [50-98] 80  Resp:  [] 16  BP: ()/() 161/74  SpO2:  [92 %-100 %] 100 %    Hemodynamic parameters for last 24 hours       Respiratory Information for the last 24 hours       Physical Exam   Physical Exam  Vitals and nursing note reviewed.   Constitutional:       General: She is not in acute distress.     Appearance: She is ill-appearing and toxic-appearing. She is not diaphoretic.   HENT:      Head: Normocephalic and atraumatic.      Mouth/Throat:      Mouth: Mucous membranes are dry.   Cardiovascular:      Rate and Rhythm: Normal rate and regular rhythm.      Pulses: Normal pulses.      Heart sounds: Normal heart sounds. No murmur heard.  Pulmonary:      Effort: No respiratory distress.      Breath sounds: Normal breath sounds. No wheezing, rhonchi or rales.   Abdominal:      General: There is no distension.      Palpations: Abdomen is soft.      Tenderness: There is no abdominal tenderness. There is no guarding.   Musculoskeletal:      Cervical back: Neck supple.      Right lower leg: No edema.      Left lower leg: No edema.   Skin:     Capillary Refill: Capillary refill takes less than 2 seconds.      Coloration: Skin is not jaundiced.      Findings: No bruising or rash.   Neurological:      General: No focal deficit present.      Mental Status: She is alert and oriented to person, place, and time.      Comments: Nonfocal exam  No facial droop  No dysarthria   Psychiatric:         Mood and Affect: Mood normal.         Medications  Current Facility-Administered Medications   Medication Dose Route Frequency Provider Last Rate Last Admin    NS (Bolus) 0.9 % infusion 500 mL  500  mL Intravenous Once PRN Renny Hernandez M.D.        fentaNYL (Sublimaze) injection 12.5-50 mcg  12.5-50 mcg Intravenous PRN Renny Hernandez M.D.   50 mcg at 12/01/24 1050    midazolam (Versed) injection 0.5-2 mg  0.5-2 mg Intravenous PRN Renny Hernandez M.D.   2 mg at 12/01/24 1050    ondansetron (Zofran) syringe/vial injection 4 mg  4 mg Intravenous PRN Renny Hernandez M.D.        ONDANSETRON HCL 4 MG/2ML INJ SOLN             ATROPINE SULFATE 0.4 MG/ML IV SOLN (WRAPPER)             HEPARIN SODIUM (PORCINE) 1000 UNIT/ML INJ SOLN             ATROPINE SULFATE 0.4 MG/ML IV SOLN (WRAPPER)             hydrALAZINE (Apresoline) injection 10-20 mg  10-20 mg Intravenous Q4HRS PRN Kaveh Bernal A.P.R.N.        niCARdipine (Cardene) 20 mg/200 mL NS Standard Infusion  0-15 mg/hr Intravenous Continuous Kaveh Bernal A.P.R.N.        labetalol (Normodyne/Trandate) injection 10-20 mg  10-20 mg Intravenous Q4HRS PRN Kaveh Bernal A.P.R.N.        ticagrelor (Brilinta) tablet 90 mg  90 mg Oral BID Mike Pepper M.D.   90 mg at 12/01/24 0555    norepinephrine (Levophed) 8 mg in 250 mL NS infusion (premix)  0-1 mcg/kg/min (Ideal) Intravenous Continuous Hero Castillo D.O.   Stopped at 12/01/24 0819    aspirin (Asa) chewable tab 81 mg  81 mg Oral DAILY Amaury Jennings M.D.   81 mg at 12/01/24 0555    atorvastatin (Lipitor) tablet 40 mg  40 mg Oral Q EVENING Amaury Jennings M.D.   40 mg at 11/30/24 1813    acetaminophen (Tylenol) tablet 650 mg  650 mg Oral Q6HRS PRN Amaury Jennings M.D.        ondansetron (Zofran) syringe/vial injection 4 mg  4 mg Intravenous Q4HRS PRN Amaury Jennings M.D.           Fluids    Intake/Output Summary (Last 24 hours) at 12/1/2024 1133  Last data filed at 12/1/2024 0821  Gross per 24 hour   Intake 845.67 ml   Output --   Net 845.67 ml       Laboratory          Recent Labs     11/29/24 2027 12/01/24  0945   SODIUM 136 140   POTASSIUM 4.0 4.4   CHLORIDE 101 107   CO2 21 22   BUN 15  8   CREATININE 0.99 0.92   CALCIUM 9.9 9.6     Recent Labs     11/29/24 2027 12/01/24  0945   ALTSGPT 28  --    ASTSGOT 28  --    ALKPHOSPHAT 96  --    TBILIRUBIN 0.2  --    GLUCOSE 102* 88     Recent Labs     11/29/24 2027 12/01/24  0415   WBC 7.9 6.4   NEUTSPOLYS 43.00*  --    LYMPHOCYTES 45.20*  --    MONOCYTES 8.60  --    EOSINOPHILS 2.40  --    BASOPHILS 0.50  --    ASTSGOT 28  --    ALTSGPT 28  --    ALKPHOSPHAT 96  --    TBILIRUBIN 0.2  --      Recent Labs     11/29/24 2027 12/01/24  0415   RBC 4.79 4.57   HEMOGLOBIN 15.9 14.9   HEMATOCRIT 46.3 44.6   PLATELETCT 181 168   PROTHROMBTM 12.7  --    APTT 27.6  --    INR 0.95  --        Imaging  MRI:   Reviewed    Assessment/Plan  * Carotid stenosis, left- (present on admission)  Assessment & Plan  Left ICA stenosis  Nonfocal exam at the moment.   Left ICA stent placement 12/1  SBP goal 110-140  I am actively titrating nicardipine gtt  BP PRNs IVP available as well  Brilinta load 180mg x1 today then 90mg BID  ASA 81 daily  Neurocheck Q2H    Acute CVA (cerebrovascular accident) (HCC)- (present on admission)  Assessment & Plan  Embolic stroke due to left ICA stenosis  MRI:  1.  Several punctate acute to subacute left temporal and parietal lobe infarcts.  2.  Slow flow or occlusion of the left internal carotid artery below the level of the clinoid process. There is reconstitution at the level of the [place].  3.  Mild diffuse cerebral and cerebellar substance loss.  4.  Mild microangiopathic ischemic change versus demyelination or gliosis.         VTE:  Not Indicated  Ulcer: Not Indicated  Lines: None    I have performed a physical exam and reviewed and updated ROS and Plan today (12/1/2024). In review of yesterday's note (11/30/2024), there are no changes except as documented above.     Discussed patient condition and risk of morbidity and/or mortality with RN, RT, Therapies, Pharmacy, Dietary, , Charge nurse / hot rounds, Patient, neurology,  and   my attending Dr Fernandez  The patient remains critically ill.  Critical care time = 35 minutes in directly providing and coordinating critical care and extensive data review.  No time overlap and excludes procedures.    Please note that this dictation was created using voice recognition software. The accuracy of the dictation is limited to the abilities of the software. I have made every reasonable attempt to correct obvious errors, but I expect that there are errors of grammar and possibly content that I did not discover before finalizing the note.     Kaveh Bernal, APRN

## 2024-12-01 NOTE — CARE PLAN
The patient is Watcher - Medium risk of patient condition declining or worsening    Shift Goals  Clinical Goals: -200, stent placement  Patient Goals: leave by wednesday  Family Goals: kvng    Progress made toward(s) clinical / shift goals:  BP parameters 110-140 post stent placement, nicardipine gtt initiated. Pt A&Ox4, NIHSS 0.     Problem: Optimal Care of the Stroke Patient  Goal: Optimal emergency care for the stroke patient  Description: Target End Date:  End of day 1    Time of Onset    1.  Time of last known well obtained  2.  Patient and family/caregiver verbalize understanding of diagnosis, medications and testing  3.  NIHSS performed and documented, including date and time, for ischemic stroke patients prior to any acute recanalization therapy (thrombolytics or mechanical) or within 12 hours of arrival if no intervention is warranted  4.  Consults and referrals placed to appropriate departments    Medications Administration as Ordered:    1.  Implement appropriate reversal agents for INR greater than 1.5  2.  Pre-alteplase administration of antihypertensives for SBP >185 DBP >110  3.  Post-alteplase administration of antihypertensives for SBP >185, DBP >105  4.  Thrombolytic Therapy for qualifying ischemic stroke patients who arrive within 4.5 hours of time of Last Known Well. Thrombolytic therapy administered within 30 minutes or a documented reason for delay  Outcome: Progressing     Problem: Neuro Status  Goal: Neuro status will remain stable or improve  Description: Target End Date:  Prior to discharge or change in level of care    Document on Neuro assessment in the Assessment flowsheet    1.  Assess and monitor neurologic status per provider order/protocol/unit policy  2.  Assess level of consciousness and orientation  3.  Assess for speech, dysarthria, dysphagia, facial symmetry  4.  Assess visual field, eye movements, gaze preference, pupil reaction and size  5.  Assess muscle strength and  motor response in all four extremities  6.  Assess for sensation (numbness and tingling)  7.  Assess basic neuro reflexes (cough, gag, corneal)  8.  Identify changes in neuro status and report to provider for testing/treatment orders  Outcome: Progressing     Problem: Hemodynamic Monitoring  Goal: Patient's hemodynamics, fluid balance and neurologic status will be stable or improve  Description: Target End Date:  Prior to discharge or change in level of care    1.  Vital signs, pulse oximetry and cardiac monitor per provider order and/or policy  2.  Frequent pulse checks performed post thrombectomy  3.  Frequent monitoring for signs of bleeding post TPA administration  4.  Proper management of IV infusions  5.  Intake and output monitored per provider order  6.  Daily weight obtained per unit policy or provider order  7.  Peripheral pulses and capillary refill assessed as needed  8.  Monitor for signs/symptoms of excessive bleeding  9.  Body temperature assessed and fevers treated  10. Patient positioned for maximum circulation/cardiac output  Outcome: Progressing     Problem: Pain - Standard  Goal: Alleviation of pain or a reduction in pain to the patient’s comfort goal  Description: Target End Date:  Prior to discharge or change in level of care    Document on Vitals flowsheet    1.  Document pain using the appropriate pain scale per order or unit policy  2.  Educate and implement non-pharmacologic comfort measures (i.e. relaxation, distraction, massage, cold/heat therapy, etc.)  3.  Pain management medications as ordered  4.  Reassess pain after pain med administration per policy  5.  If opiods administered assess patient's response to pain medication is appropriate per POSS sedation scale  6.  Follow pain management plan developed in collaboration with patient and interdisciplinary team (including palliative care or pain specialists if applicable)  Outcome: Progressing       Patient is not progressing towards  the following goals:

## 2024-12-01 NOTE — H&P
History and Physical    Date: 12/1/2024    PCP: Pcp Not In Computer      CC:    Numbness       Pt reports right foot cramping approx 1 hour prior that radiated up her right leg and turned into numbness. Pt states the numbness then traveled up her right arm and she felt as though she cannot speak. Pt says that she feels some numbness in her RUE but leg numbness has subsided. -thinners/ASA       HPI: This is a 68 y.o. female who is presenting with small infarcts in the left frontoparietal region likely from an underlying high grade ~ 99%  Left ICA stenosis with collapse of the distal ICA.    History reviewed. No pertinent past medical history.    History reviewed. No pertinent surgical history.    Current Facility-Administered Medications   Medication Dose Route Frequency Provider Last Rate Last Admin    ticagrelor (Brilinta) tablet 90 mg  90 mg Oral BID Mike Pepper M.D.   90 mg at 12/01/24 0555    norepinephrine (Levophed) 8 mg in 250 mL NS infusion (premix)  0-1 mcg/kg/min (Ideal) Intravenous Continuous Hero Castillo D.O.   Stopped at 12/01/24 0819    aspirin (Asa) chewable tab 81 mg  81 mg Oral DAILY Amaury Jennings M.D.   81 mg at 12/01/24 0555    atorvastatin (Lipitor) tablet 40 mg  40 mg Oral Q EVENING Amaury Jennings M.D.   40 mg at 11/30/24 1813    acetaminophen (Tylenol) tablet 650 mg  650 mg Oral Q6HRS PRN Amaury Jennings M.D.        ondansetron (Zofran) syringe/vial injection 4 mg  4 mg Intravenous Q4HRS PRN Amaury Jennings M.D.            Social History     Socioeconomic History    Marital status: Single     Spouse name: Not on file    Number of children: Not on file    Years of education: Not on file    Highest education level: Not on file   Occupational History    Not on file   Tobacco Use    Smoking status: Never    Smokeless tobacco: Never   Substance and Sexual Activity    Alcohol use: Not Currently    Drug use: Never    Sexual activity: Not on file   Other Topics Concern    Not on  file   Social History Narrative    Not on file     Social Drivers of Health     Financial Resource Strain: Not on file   Food Insecurity: No Food Insecurity (11/29/2024)    Hunger Vital Sign     Worried About Running Out of Food in the Last Year: Never true     Ran Out of Food in the Last Year: Never true   Transportation Needs: No Transportation Needs (11/29/2024)    PRAPARE - Transportation     Lack of Transportation (Medical): No     Lack of Transportation (Non-Medical): No   Physical Activity: Not on file   Stress: Not on file   Social Connections: Not on file   Intimate Partner Violence: Not At Risk (11/29/2024)    Humiliation, Afraid, Rape, and Kick questionnaire     Fear of Current or Ex-Partner: No     Emotionally Abused: No     Physically Abused: No     Sexually Abused: No   Housing Stability: Low Risk  (11/29/2024)    Housing Stability Vital Sign     Unable to Pay for Housing in the Last Year: No     Number of Times Moved in the Last Year: 0     Homeless in the Last Year: No       History reviewed. No pertinent family history.    Allergies:  Bloodless    Review of Systems:  Negative      Physical Exam  Nursing note reviewed.   Constitutional:       General: She is not in acute distress.     Appearance: Normal appearance.   Pulmonary:      Effort: Pulmonary effort is normal. No respiratory distress.   Skin:     General: Skin is warm and dry.   Neurological:      General: No focal deficit present.      Mental Status: She is alert and oriented to person, place, and time.   Psychiatric:         Mood and Affect: Mood normal.         Behavior: Behavior normal.         Thought Content: Thought content normal.         Judgment: Judgment normal.         Vital Signs  Blood Pressure : (!) 203/84   Temperature: 35.8 °C (96.5 °F)   Pulse: 91   Respiration: 18   Pulse Oximetry: 99 %        Labs:  Recent Labs     11/29/24 2027 12/01/24  0415   WBC 7.9 6.4   RBC 4.79 4.57   HEMOGLOBIN 15.9 14.9   HEMATOCRIT 46.3 44.6    MCV 96.7 97.6   MCH 33.2* 32.6   MCHC 34.3 33.4   RDW 44.0 44.8   PLATELETCT 181 168   MPV 13.3* 13.5*     Recent Labs     11/29/24 2027   SODIUM 136   POTASSIUM 4.0   CHLORIDE 101   CO2 21   GLUCOSE 102*   BUN 15   CREATININE 0.99   CALCIUM 9.9     Recent Labs     11/29/24 2027   APTT 27.6   INR 0.95     Recent Labs     11/29/24 2027   ASTSGOT 28   ALTSGPT 28   TBILIRUBIN 0.2   ALKPHOSPHAT 96   GLOBULIN 2.9   INR 0.95       Radiology:  EC-ECHOCARDIOGRAM COMPLETE W/O CONT         MR-BRAIN-W/O   Final Result      1.  Several punctate acute to subacute left temporal and parietal lobe infarcts.   2.  Slow flow or occlusion of the left internal carotid artery below the level of the clinoid process. There is reconstitution at the level of the [place].   3.  Mild diffuse cerebral and cerebellar substance loss.   4.  Mild microangiopathic ischemic change versus demyelination or gliosis.      DX-CHEST-PORTABLE (1 VIEW)   Final Result      Interstitial pulmonary edema      CT-CTA NECK WITH & W/O-POST PROCESSING   Final Result      Occlusion or subtotal occlusion of the proximal LEFT internal carotid artery with estimated 50% stenosis of the petrous portion of the LEFT internal carotid artery      CT-CTA HEAD WITH & W/O-POST PROCESS   Final Result      CT angiogram of the [place] within normal limits.         CT-CEREBRAL PERFUSION ANALYSIS   Final Result      1. Cerebral blood flow less than 30% possibly representing completed infarct = 0 mL.      2. T Max more than 6 seconds possibly representing combination of completed infarct and ischemia = 0 mL.      3. Mismatched volume possibly representing ischemic brain/penumbra= 0 mL      4.  Please note that this cerebral perfusion study and report is Quantitative and targets supratentorial (cerebral) perfusion for evaluation of large vessel territory acute ischemia/infarction. For example, lacunar infarcts, and brainstem/posterior fossa     "ischemia/infarction are not evaluated on this study.  Data acquisition is subject to artifacts which can yield non-anatomically plausible perfusion maps which may be due to motion, bolus timing, signal to noise ratio, or other technical factors.    Perfusion map abnormalities which show non-anatomic distributions are likely artifact.   This study is not \"stand-alone\" and should only be utilized for diagnosis, management/treatment in correlation with CT, CTA, and/or MRI and clinical factors.         CT-HEAD W/O   Final Result      1.  No acute intracranial abnormality.   2.  Small areas of encephalomalacia in the cortex of the LEFT frontoparietal vertex   3.  Senescent changes            IR-CERV CAROTID STENT WITH PROTECTION    (Results Pending)             Assessment and Plan: This is a 68 y.o. with LICA 99% near occlusive stenosis with left hemispheric strokes presenting today for a LICA stent placement. The indications, benefits, alternatives and risks to the procedure were discussed with the patient and  family. Risks discussed included but were not limited to bleeding, infection, stroke, injury to nearby structures, new temporary or permanent motor weakness, non target embolization due to migration of embolic material/cement,  arterial or venous injury and rarely death. On label and Off label use of catheters, needles, implantable stents/devices and embolic agents was also discussed. Possibility of failure of procedure or need for a repeat procedure or staged additional procedures were also discussed. Patient provided consent and is agreeable to go ahead with the planned procedure.  Procedure will be performed under moderate sedation.                   "

## 2024-12-01 NOTE — PROGRESS NOTES
Pt presents to ir2. pt was consented by MD at bedside, confirmed by this RN and consent at bedside. Pt transferred to IR2 table in Supine position. Patient underwent an image guided cerebral angiogram with left carotid stent placement by Dr. Hernandez. Procedure site was marked by MD and verified using imaging guidance. Pt placed on monitor, prepped and draped in a sterile fashion. Vitals were taken every 5 minutes and remained stable during procedure (see doc flow sheet for results). CO2 waveform capnography was monitored and remained WNL throughout procedure. Report called to Virginia REDMAN RN. Pt transported by stretcher with RN to R112.     [employer]  Carotid Wallstent [id number]  REF: [id number]  LOT: [id number]  EXP: 01/25/2028    Angioseal VIP  8F deployed at 1123  REF: [id number]  LOT: [id number]  EXP: 04/29/2024      Golf ball size hematoma formed right groin, Mary notified, 25 protamine given pressure held 35 min.

## 2024-12-01 NOTE — HOSPITAL COURSE
"\"68 y.o. female with no known medical history who was admitted on 11/29 after had a right side weakness and numbness and right facial droop on Thursday 11/28.  symptoms were completely resolved.  Yesterday she again had the symptoms and resolved fairly quickly.  Initial blood pressure at the ED was 224/103. CTA show critical stenosis of the left ICA at the bifurcation.  MRI of the brain shows small embolic appearing infarct over the left hemisphere.  Neurology was consulted and plan for left ICA stent placement tomorrow morning.  Recommend blood pressure goal to be 160-200.  Patient is loaded with Brilinta 180 mg now and follow-up with 90 mg twice a day.  Patient is on aspirin 81 mg daily     Pt also would like everyone to know that she has a flight to catch on Wednesday at 11.30am back to [place]. She lives in [place], [place] and visiting daughter here for Thanksgiving. She said she will not cancel the flight and would leave hospital AMA on Wednesday if she is still in hospital by then.\" - Dr Castillo H&P    11/30- Tx to ICU for BP control. SBP goal 160-200. Neuro IR consulted for left ICA stent placement  12/1- IR for left ICA stent placement. SBP goal 110-140  "

## 2024-12-01 NOTE — OR SURGEON
Immediate Post- Operative Note        Findings: Left ICA 99% stenosis. 8 x 29 Wall stent deployed with good results.      Procedure(s): Left ICA stent placement.      Estimated Blood Loss: Less than 5 ml        Complications: None            12/1/2024     11:26 AM     Renny Hernandez M.D.

## 2024-12-02 ENCOUNTER — PHARMACY VISIT (OUTPATIENT)
Dept: PHARMACY | Facility: MEDICAL CENTER | Age: 69
End: 2024-12-02
Payer: COMMERCIAL

## 2024-12-02 VITALS
BODY MASS INDEX: 32.86 KG/M2 | RESPIRATION RATE: 15 BRPM | OXYGEN SATURATION: 98 % | DIASTOLIC BLOOD PRESSURE: 60 MMHG | HEIGHT: 64 IN | SYSTOLIC BLOOD PRESSURE: 118 MMHG | TEMPERATURE: 97 F | HEART RATE: 70 BPM | WEIGHT: 192.46 LBS

## 2024-12-02 LAB
ANION GAP SERPL CALC-SCNC: 11 MMOL/L (ref 7–16)
BASOPHILS # BLD AUTO: 0.2 % (ref 0–1.8)
BASOPHILS # BLD: 0.02 K/UL (ref 0–0.12)
BUN SERPL-MCNC: 9 MG/DL (ref 8–22)
CALCIUM SERPL-MCNC: 9.2 MG/DL (ref 8.5–10.5)
CHLORIDE SERPL-SCNC: 106 MMOL/L (ref 96–112)
CO2 SERPL-SCNC: 20 MMOL/L (ref 20–33)
CREAT SERPL-MCNC: 1.02 MG/DL (ref 0.5–1.4)
EOSINOPHIL # BLD AUTO: 0.13 K/UL (ref 0–0.51)
EOSINOPHIL NFR BLD: 1.4 % (ref 0–6.9)
ERYTHROCYTE [DISTWIDTH] IN BLOOD BY AUTOMATED COUNT: 45.1 FL (ref 35.9–50)
GFR SERPLBLD CREATININE-BSD FMLA CKD-EPI: 60 ML/MIN/1.73 M 2
GLUCOSE SERPL-MCNC: 122 MG/DL (ref 65–99)
HCT VFR BLD AUTO: 40.7 % (ref 37–47)
HGB BLD-MCNC: 13.9 G/DL (ref 12–16)
IMM GRANULOCYTES # BLD AUTO: 0.05 K/UL (ref 0–0.11)
IMM GRANULOCYTES NFR BLD AUTO: 0.5 % (ref 0–0.9)
LYMPHOCYTES # BLD AUTO: 2.38 K/UL (ref 1–4.8)
LYMPHOCYTES NFR BLD: 25.6 % (ref 22–41)
MCH RBC QN AUTO: 33.3 PG (ref 27–33)
MCHC RBC AUTO-ENTMCNC: 34.2 G/DL (ref 32.2–35.5)
MCV RBC AUTO: 97.6 FL (ref 81.4–97.8)
MONOCYTES # BLD AUTO: 0.77 K/UL (ref 0–0.85)
MONOCYTES NFR BLD AUTO: 8.3 % (ref 0–13.4)
NEUTROPHILS # BLD AUTO: 5.96 K/UL (ref 1.82–7.42)
NEUTROPHILS NFR BLD: 64 % (ref 44–72)
NRBC # BLD AUTO: 0 K/UL
NRBC BLD-RTO: 0 /100 WBC (ref 0–0.2)
PLATELET # BLD AUTO: 202 K/UL (ref 164–446)
PMV BLD AUTO: 13.7 FL (ref 9–12.9)
POTASSIUM SERPL-SCNC: 4.1 MMOL/L (ref 3.6–5.5)
RBC # BLD AUTO: 4.17 M/UL (ref 4.2–5.4)
SODIUM SERPL-SCNC: 137 MMOL/L (ref 135–145)
WBC # BLD AUTO: 9.3 K/UL (ref 4.8–10.8)

## 2024-12-02 PROCEDURE — 99232 SBSQ HOSP IP/OBS MODERATE 35: CPT | Performed by: PSYCHIATRY & NEUROLOGY

## 2024-12-02 PROCEDURE — A9270 NON-COVERED ITEM OR SERVICE: HCPCS | Performed by: STUDENT IN AN ORGANIZED HEALTH CARE EDUCATION/TRAINING PROGRAM

## 2024-12-02 PROCEDURE — 80048 BASIC METABOLIC PNL TOTAL CA: CPT

## 2024-12-02 PROCEDURE — 99233 SBSQ HOSP IP/OBS HIGH 50: CPT

## 2024-12-02 PROCEDURE — 99239 HOSP IP/OBS DSCHRG MGMT >30: CPT | Performed by: HOSPITALIST

## 2024-12-02 PROCEDURE — RXMED WILLOW AMBULATORY MEDICATION CHARGE: Performed by: HOSPITALIST

## 2024-12-02 PROCEDURE — A9270 NON-COVERED ITEM OR SERVICE: HCPCS | Performed by: PSYCHIATRY & NEUROLOGY

## 2024-12-02 PROCEDURE — 700102 HCHG RX REV CODE 250 W/ 637 OVERRIDE(OP): Performed by: PSYCHIATRY & NEUROLOGY

## 2024-12-02 PROCEDURE — 700102 HCHG RX REV CODE 250 W/ 637 OVERRIDE(OP): Performed by: STUDENT IN AN ORGANIZED HEALTH CARE EDUCATION/TRAINING PROGRAM

## 2024-12-02 PROCEDURE — 85025 COMPLETE CBC W/AUTO DIFF WBC: CPT

## 2024-12-02 RX ORDER — ATORVASTATIN CALCIUM 40 MG/1
80 TABLET, FILM COATED ORAL EVERY EVENING
Status: DISCONTINUED | OUTPATIENT
Start: 2024-12-02 | End: 2024-12-02 | Stop reason: HOSPADM

## 2024-12-02 RX ORDER — ATORVASTATIN CALCIUM 80 MG/1
80 TABLET, FILM COATED ORAL EVERY EVENING
Qty: 30 TABLET | Refills: 11 | Status: SHIPPED | OUTPATIENT
Start: 2024-12-02

## 2024-12-02 RX ORDER — ASPIRIN 81 MG/1
81 TABLET, CHEWABLE ORAL DAILY
Qty: 100 TABLET | Refills: 11 | Status: SHIPPED | OUTPATIENT
Start: 2024-12-03

## 2024-12-02 RX ADMIN — TICAGRELOR 90 MG: 90 TABLET ORAL at 05:17

## 2024-12-02 RX ADMIN — ASPIRIN 81 MG: 81 TABLET, CHEWABLE ORAL at 05:17

## 2024-12-02 ASSESSMENT — ENCOUNTER SYMPTOMS
VOMITING: 0
FEVER: 0
SENSORY CHANGE: 0
SPEECH CHANGE: 0
PALPITATIONS: 0
HEADACHES: 0
SHORTNESS OF BREATH: 0
NAUSEA: 0
CHILLS: 0
TINGLING: 0
WEAKNESS: 0

## 2024-12-02 ASSESSMENT — PAIN DESCRIPTION - PAIN TYPE
TYPE: ACUTE PAIN

## 2024-12-02 NOTE — DISCHARGE INSTRUCTIONS
Discharge Instructions per Carlos Mcleod D.O.      DIET: healthy balanced diet    ACTIVITY: as tolerates    DIAGNOSIS: Carotid artery stenosis requiring stent placement.    Return to ER if needed    Discharge Instructions    Discharged to home by car with relative. Discharged via wheelchair, hospital escort: Yes.  Special equipment needed: Not Applicable    Be sure to schedule a follow-up appointment with your primary care doctor or any specialists as instructed.     Discharge Plan:   Influenza Vaccine Indication: Patient Refuses    I understand that a diet low in cholesterol, fat, and sodium is recommended for good health. Unless I have been given specific instructions below for another diet, I accept this instruction as my diet prescription.   Other diet: Regular diet    Special Instructions:     Stroke/CVA/TIA/Hemorrhagic Ischemia Discharge Instructions  You have had a stroke. Your risk factors have been identified as follows:  Age - Over 55  High blood pressure  Carotid Stenosis   High Cholesterol and lipids  It is important that you reduce your risk factors to avoid another stroke in the future. Here are some general guidelines to follow:  Eat healthy - avoid food high in fat.  Get regular exercise.  Maintain a healthy weight.  Avoid smoking.  Avoid alcohol and illegal drug use.  Take your medications as directed.  For more information regarding risk factors, refer to pages 17-19 in your Stroke Patient Education Guide. Stroke Education Guide was given to patient.    Warning signs of a stroke include (which can also be found on page 3 of your Stroke Patient Education Guide):  Sudden numbness of weakness of the face, arm or leg (especially on one side of the body).  Sudden confusion, trouble speaking or understanding.  Sudden trouble seeing in one or both eyes.  Sudden trouble walking, dizziness, loss of balance or coordination.  Sudden severe headache with no known cause.  It is very important to get  treatment quickly when a stroke occurs. If you experience any of the above warning signs, call [phone] immediately.     Some patients who have had a stroke will be going home on a blood thinner medication called Warfarin (Coumadin).  This medication requires very close monitoring and follow up.  This follow up can be provided by either your Primary Care Physician or by [facility] Outpatient Anticoagulation Service.  The Outpatient Anticoagulation Service is located at the [facility] at [facility] ([facility]).  If you do not know when your follow up appointment is scheduled, call [phone] to verify your appointment time.    -Is this patient being discharged with medication to prevent blood clots?  Yes, Aspirin Aspirin Tablets  What is this medication?  ASPIRIN (AS pir in) lowers the risk of heart attack, stroke, or blood clot. It may also be used to treat mild to moderate pain, inflammation, or arthritis. It belongs to a group of medications called NSAIDs.  This medicine may be used for other purposes; ask your health care provider or pharmacist if you have questions.  COMMON BRAND NAME(S): Aspir-Low, Aspir-Alessandra, Aspirtab, [employer] Advanced Aspirin, Bre Aspirin, Bre Aspirin Extra Strength, Bre Aspirin Plus, Bre Extra Strength, Bre Extra Strength Plus, Bre Genuine Aspirin, Bre Womens Aspirin, Bufferin, Bufferin Extra Strength, Bufferin Low Dose  What should I tell my care team before I take this medication?  They need to know if you have any of these conditions:  Anemia  Asthma  Bleeding problems  Diabetes  Gout  History of stomach ulcers or bleeding  If you often drink alcohol  Kidney disease  Liver disease  Low level of vitamin K  Lupus  Smoke tobacco  An unusual or allergic reaction to aspirin, tartrazine dye, other medications, dyes, or preservatives  Pregnant or trying to get pregnant  Breast-feeding  How should I use this medication?  Take this  medication by mouth with a glass of water. Follow the directions on the package or prescription label. You can take this medication with or without food. If it upsets your stomach, take it with food. Do not take it more often than directed.  Talk to your care team about the use of this medication in children. While this medication may be prescribed for children as young as 12 years of age for selected conditions, precautions do apply. Children and teenagers should not use this medication to treat chicken pox or flu symptoms unless directed by a care team.  Patients over 65 years old may have a stronger reaction and need a smaller dose.  Overdosage: If you think you have taken too much of this medicine contact a poison control center or emergency room at once.  NOTE: This medicine is only for you. Do not share this medicine with others.  What if I miss a dose?  If you are taking this medication on a regular schedule and miss a dose, take it as soon as you can. If it is almost time for your next dose, take only that dose. Do not take double or extra doses.  What may interact with this medication?  Do not take this medication with any of the following:  Cidofovir  Ketorolac  Probenecid  This medication may also interact with the following:  Alcohol  Alendronate  Bismuth subsalicylate  Flavocoxid  Herbal supplements like feverfew, garlic, marii, ginkgo biloba, horse chestnut  Medications for diabetes or glaucoma like acetazolamide, methazolamide  Medications for gout  Medications that prevent or treat blood clots like apixaban, clopidogrel, enoxaparin, heparin, rivaroxaban, warfarin  Other aspirin and aspirin-like medications  NSAIDs, medications for pain and inflammation, like ibuprofen or naproxen  Pemetrexed  Sulfinpyrazone  Varicella live vaccine  This list may not describe all possible interactions. Give your health care provider a list of all the medicines, herbs, non-prescription drugs, or dietary supplements  you use. Also tell them if you smoke, drink alcohol, or use illegal drugs. Some items may interact with your medicine.  What should I watch for while using this medication?  If you are treating yourself for pain, tell your doctor or health care provider if the pain lasts more than 10 days, if it gets worse, or if there is a new or different kind of pain. Tell your doctor if you see redness or swelling. Also, check with your doctor if you have a fever that lasts for more than 3 days. Only take this medication to prevent heart attacks or blood clotting if prescribed by your doctor or health care provider.  Do not take other medications that contain aspirin, ibuprofen, or naproxen with this medication. Side effects such as stomach upset, nausea, or ulcers may be more likely to occur. Many non-prescription medications contain aspirin, ibuprofen, or naproxen. Always read labels carefully.  This medication can cause serious ulcers and bleeding in the stomach. It can happen with no warning. Smoking, drinking alcohol, older age, and poor health can also increase risks. Call your health care provider right away if you have stomach pain or blood in your vomit or stool.  Alcohol may interfere with the effect of this medication. Avoid alcoholic drinks.  This medication may cause serious skin reactions. They can happen weeks to months after starting the medication. Contact your health care provider right away if you notice fevers or flu-like symptoms with a rash. The rash may be red or purple and then turn into blisters or peeling of the skin. Or, you might notice a red rash with swelling of the face, lips or lymph nodes in your neck or under your arms.  Talk to your health care provider if you are pregnant before taking this medication. Taking this medication between weeks 20 and 30 of pregnancy may harm your unborn baby. Your health care provider will monitor you closely if you need to take it. After 30 weeks of pregnancy, do  not take this medication.  Be careful brushing or flossing your teeth or using a toothpick because you may get an infection or bleed more easily. If you have any dental work done, tell your dentist you are receiving this medication.  This medication may make it more difficult to get pregnant. Talk to your health care provider if you are concerned about your fertility.  What side effects may I notice from receiving this medication?  Side effects that you should report to your care team as soon as possible:  Allergic reactions--skin rash, itching, hives, swelling of the face, lips, tongue, or throat  Bleeding--bloody or black, tar-like stools, vomiting blood or brown material that looks like coffee grounds, red or dark brown urine, red or purple spots on skin, unusual bruising or bleeding  Hearing loss, ringing in ears  Kidney injury--decrease in the amount of urine, swelling of the ankles, hands, or feet  Liver injury--right upper belly pain, loss of appetite, nausea, light-colored stool, dark yellow or brown urine, yellowing of the skin or eyes, unusual weakness, fatigue  Rash, fever, and swollen lymph nodes  Side effects that usually do not require medical attention (report to your care team if they continue or are bothersome):  Headache  Loss of appetite  Nausea  Upset stomach  This list may not describe all possible side effects. Call your doctor for medical advice about side effects. You may report side effects to FDA at 6-271-FDA-5902.  Where should I keep my medication?  Keep out of the reach of children and pets.  Store at room temperature between 15 and 30 degrees C (59 and 86 degrees F). Protect from heat and moisture. Get rid of any unused medication after the expiration date.  Do not use this medication if it has a strong vinegar smell.  To get rid of medications that are no longer needed or have :  Take the medication to a medication take-back program. Check with your pharmacy or law enforcement  to find a location.  If you cannot return the medication, check the label or package insert to see if the medication should be thrown out in the garbage or flushed down the toilet. If you are not sure, ask your care team. If it is safe to put it in the trash, empty the medication out of the container. Mix the medication with cat litter, dirt, coffee grounds, or other unwanted substance. Seal the mixture in a bag or container. Put it in the trash.  NOTE: This sheet is a summary. It may not cover all possible information. If you have questions about this medicine, talk to your doctor, pharmacist, or health care provider.  © 2023 [employer]/Gold Standard (2021-10-29 00:00:00)    Is patient discharged on Warfarin / Coumadin?   No

## 2024-12-02 NOTE — PROGRESS NOTES
D/C'd.  Discharge instructions provided to pt.  Pt states understanding.  Pt states all questions have been answered.  Copy of discharge provided to pt.  Signed copy in chart.  Prescriptions provided to ptper meds to beds. Pt states that all personal belongings are in possession.   Pt escorted off unit with assistance from CCT via wheelchair without incident.

## 2024-12-02 NOTE — PROGRESS NOTES
"Critical Care Progress Note    Date of admission  11/29/2024    Chief Complaint  68 y.o. female admitted 11/29/2024 with   Chief Complaint   Patient presents with    Numbness     Pt reports right foot cramping approx 1 hour prior that radiated up her right leg and turned into numbness. Pt states the numbness then traveled up her right arm and she felt as though she cannot speak. Pt says that she feels some numbness in her RUE but leg numbness has subsided. -thinners/ASA         Hospital Course  \"68 y.o. female with no known medical history who was admitted on 11/29 after had a right side weakness and numbness and right facial droop on Thursday 11/28.  symptoms were completely resolved.  Yesterday she again had the symptoms and resolved fairly quickly.  Initial blood pressure at the ED was 224/103. CTA show critical stenosis of the left ICA at the bifurcation.  MRI of the brain shows small embolic appearing infarct over the left hemisphere.  Neurology was consulted and plan for left ICA stent placement tomorrow morning.  Recommend blood pressure goal to be 160-200.  Patient is loaded with Brilinta 180 mg now and follow-up with 90 mg twice a day.  Patient is on aspirin 81 mg daily     Pt also would like everyone to know that she has a flight to catch on Wednesday at 11.30am back to [place]. She lives in [place], [place] and visiting daughter here for Thanksgiving. She said she will not cancel the flight and would leave hospital AMA on Wednesday if she is still in hospital by then.\" - Dr Castillo H&P    11/30- Tx to ICU for BP control. SBP goal 160-200. Neuro IR consulted for left ICA stent placement  12/1- IR for left ICA stent placement. SBP goal 110-140    Interval Problem Update  Reviewed last 24 hour events:  - Overnight Events:  - NAONE   - Tm: AF  - Neuro: No acute neuro changes   - HR: 60-90   - SBP: 1teens-170   - UOP: voids in the bathroom   - Young: n   - GI: reg  - Lines: piv   - PPx: GI none, DVT none   - Mobility " level 4    Infusions:   -levo    ABX:   -none    Review of Systems  ROS     Vital Signs for last 24 hours   Temp:  [36.1 °C (97 °F)-36.4 °C (97.6 °F)] 36.1 °C (97 °F)  Pulse:  [] 68  Resp:  [15-17] 15  BP: ()/(46-81) 118/56  SpO2:  [90 %-100 %] 98 %    Hemodynamic parameters for last 24 hours       Respiratory Information for the last 24 hours       Physical Exam   Physical Exam    Medications  Current Facility-Administered Medications   Medication Dose Route Frequency Provider Last Rate Last Admin    atorvastatin (Lipitor) tablet 80 mg  80 mg Oral Q EVENING Mike Locke M.D.        hydrALAZINE (Apresoline) injection 10-20 mg  10-20 mg Intravenous Q4HRS PRN Kaveh Bernal A.P.R.N.   10 mg at 12/01/24 1742    labetalol (Normodyne/Trandate) injection 10-20 mg  10-20 mg Intravenous Q4HRS PRN Kaveh Bernal A.P.R.N.        ticagrelor (Brilinta) tablet 90 mg  90 mg Oral BID Mike Pepper M.D.   90 mg at 12/02/24 0517    aspirin (Asa) chewable tab 81 mg  81 mg Oral DAILY Amaury Jennings M.D.   81 mg at 12/02/24 0517    acetaminophen (Tylenol) tablet 650 mg  650 mg Oral Q6HRS PRCIARAN Jennings M.D.   650 mg at 12/01/24 1454    ondansetron (Zofran) syringe/vial injection 4 mg  4 mg Intravenous Q4HRS PRN Amaury Jennings M.D.   4 mg at 12/01/24 1848       Fluids    Intake/Output Summary (Last 24 hours) at 12/2/2024 1157  Last data filed at 12/2/2024 1031  Gross per 24 hour   Intake 1654.12 ml   Output --   Net 1654.12 ml       Laboratory          Recent Labs     11/29/24 2027 12/01/24  0945 12/02/24  0430   SODIUM 136 140 137   POTASSIUM 4.0 4.4 4.1   CHLORIDE 101 107 106   CO2 21 22 20   BUN 15 8 9   CREATININE 0.99 0.92 1.02   CALCIUM 9.9 9.6 9.2     Recent Labs     11/29/24 2027 12/01/24  0945 12/02/24  0430   ALTSGPT 28  --   --    ASTSGOT 28  --   --    ALKPHOSPHAT 96  --   --    TBILIRUBIN 0.2  --   --    GLUCOSE 102* 88 122*     Recent Labs     11/29/24 2027 12/01/24  0415 12/02/24  0430    WBC 7.9 6.4 9.3   NEUTSPOLYS 43.00*  --  64.00   LYMPHOCYTES 45.20*  --  25.60   MONOCYTES 8.60  --  8.30   EOSINOPHILS 2.40  --  1.40   BASOPHILS 0.50  --  0.20   ASTSGOT 28  --   --    ALTSGPT 28  --   --    ALKPHOSPHAT 96  --   --    TBILIRUBIN 0.2  --   --      Recent Labs     11/29/24 2027 12/01/24  0415 12/02/24  0430   RBC 4.79 4.57 4.17*   HEMOGLOBIN 15.9 14.9 13.9   HEMATOCRIT 46.3 44.6 40.7   PLATELETCT 181 168 202   PROTHROMBTM 12.7  --   --    APTT 27.6  --   --    INR 0.95  --   --        Imaging  CT:    Reviewed    Assessment/Plan  * Carotid stenosis, left- (present on admission)  Assessment & Plan  Left ICA stenosis  Nonfocal exam at the moment.   Left ICA stent placement 12/1  SBP goal < 140  BP PRNs IVP available as well  Brilinta load 180mg x1 today then 90mg BID  ASA 81 daily      Acute CVA (cerebrovascular accident) (HCC)- (present on admission)  Assessment & Plan  Embolic stroke due to left ICA stenosis  MRI:  1.  Several punctate acute to subacute left temporal and parietal lobe infarcts.  2.  Slow flow or occlusion of the left internal carotid artery below the level of the clinoid process. There is reconstitution at the level of the [place].  3.  Mild diffuse cerebral and cerebellar substance loss.  4.  Mild microangiopathic ischemic change versus demyelination or gliosis.    atorvastatin         VTE:  Not Indicated  Ulcer: H2 Antagonist  Lines: None    I have performed a physical exam and reviewed and updated ROS and Plan today (12/2/2024). In review of yesterday's note (12/1/2024), there are no changes except as documented above.     Discussed patient condition and risk of morbidity and/or mortality with Hospitalist, RN, RT, Therapies, Pharmacy, Dietary, , Charge nurse / hot rounds, Patient, neurology, and   my attending Dr Lechuga    Please note that this dictation was created using voice recognition software. The accuracy of the dictation is limited to the  abilities of the software. I have made every reasonable attempt to correct obvious errors, but I expect that there are errors of grammar and possibly content that I did not discover before finalizing the note.     Kaveh Bernal, APRN

## 2024-12-02 NOTE — PROGRESS NOTES
"Neurology Progress Note  Neurohospitalist Service, [facility]    Referring Physician: Timmy Lechuga M.D.      Interval History: No acute events overnight. Intermittent levophed for SBP < 110.  Neurologic exam is stable, non-focal.    Review of systems: In addition to what is detailed in the HPI and/or updated in the interval history, all other systems reviewed and are negative.    Past Medical History, Past Surgical History and Social History reviewed and unchanged from prior    Medications:    Current Facility-Administered Medications:     atorvastatin (Lipitor) tablet 80 mg, 80 mg, Oral, Q EVENING, Mike Locke M.D.    niCARdipine (Cardene) 20 mg/200 mL NS Standard Infusion, 0-15 mg/hr, Intravenous, Continuous, Renny Hernandez M.D., Stopped at 12/01/24 1632    hydrALAZINE (Apresoline) injection 10-20 mg, 10-20 mg, Intravenous, Q4HRS PRN, Kaveh Bernal, A.P.R.N., 10 mg at 12/01/24 1742    labetalol (Normodyne/Trandate) injection 10-20 mg, 10-20 mg, Intravenous, Q4HRS PRN, Kaveh Bernal, A.P.R.N.    ticagrelor (Brilinta) tablet 90 mg, 90 mg, Oral, BID, Mike Pepper M.D., 90 mg at 12/02/24 0517    norepinephrine (Levophed) 8 mg in 250 mL NS infusion (premix), 0-1 mcg/kg/min (Ideal), Intravenous, Continuous, Kaveh Bernal, A.P.R.N., Last Rate: 8.2 mL/hr at 12/02/24 0822, 0.08 mcg/kg/min at 12/02/24 0822    aspirin (Asa) chewable tab 81 mg, 81 mg, Oral, DAILY, Amaury Jennings M.D., 81 mg at 12/02/24 0517    acetaminophen (Tylenol) tablet 650 mg, 650 mg, Oral, Q6HRS PRN, Amaury Jennings M.D., 650 mg at 12/01/24 1454    ondansetron (Zofran) syringe/vial injection 4 mg, 4 mg, Intravenous, Q4HRS PRN, Amaury Jennings M.D., 4 mg at 12/01/24 1384    Physical Examination:   /55   Pulse 84   Temp 36.2 °C (97.2 °F) (Temporal)   Resp 17   Ht 1.626 m (5' 4\")   Wt 87.3 kg (192 lb 7.4 oz)   SpO2 97%   Breastfeeding No   BMI 33.04 kg/m²       General: Patient is awake and in no " acute distress  Neck: There is normal range of motion  CV: Regular rate   Extremities:  Warm, dry, and intact, without peripheral lower extremity edema    NEUROLOGICAL EXAM:     Mental status: Awake, alert and fully oriented, follows commands  Speech and language: Speech is clear and fluent. The patient is able to name and repeat.  Cranial nerve exam: Visual fields are full. There is no nystagmus. Extraocular muscles are intact. Face is symmetric.   Motor exam: There is sustained antigravity with no downward drift in bilateral arms and legs.    Sensory exam:  Reacts to tactile in all 4 extremities, there is no neglect to double stim  Coordination: No ataxia on elicited movements    Objective Data:    Labs:  Lab Results   Component Value Date/Time    PROTHROMBTM 12.7 11/29/2024 08:27 PM    INR 0.95 11/29/2024 08:27 PM      Lab Results   Component Value Date/Time    WBC 9.3 12/02/2024 04:30 AM    RBC 4.17 (L) 12/02/2024 04:30 AM    HEMOGLOBIN 13.9 12/02/2024 04:30 AM    HEMATOCRIT 40.7 12/02/2024 04:30 AM    MCV 97.6 12/02/2024 04:30 AM    MCH 33.3 (H) 12/02/2024 04:30 AM    MCHC 34.2 12/02/2024 04:30 AM    MPV 13.7 (H) 12/02/2024 04:30 AM    NEUTSPOLYS 64.00 12/02/2024 04:30 AM    LYMPHOCYTES 25.60 12/02/2024 04:30 AM    MONOCYTES 8.30 12/02/2024 04:30 AM    EOSINOPHILS 1.40 12/02/2024 04:30 AM    BASOPHILS 0.20 12/02/2024 04:30 AM      Lab Results   Component Value Date/Time    SODIUM 137 12/02/2024 04:30 AM    POTASSIUM 4.1 12/02/2024 04:30 AM    CHLORIDE 106 12/02/2024 04:30 AM    CO2 20 12/02/2024 04:30 AM    GLUCOSE 122 (H) 12/02/2024 04:30 AM    BUN 9 12/02/2024 04:30 AM    CREATININE 1.02 12/02/2024 04:30 AM      Lab Results   Component Value Date/Time    CHOLSTRLTOT 203 (H) 11/30/2024 02:24 AM     (H) 11/30/2024 02:24 AM    HDL 51 11/30/2024 02:24 AM    TRIGLYCERIDE 93 11/30/2024 02:24 AM       Lab Results   Component Value Date/Time    ALKPHOSPHAT 96 11/29/2024 08:27 PM    ASTSGOT 28 11/29/2024 08:27  PM    ALTSGPT 28 11/29/2024 08:27 PM    TBILIRUBIN 0.2 11/29/2024 08:27 PM        Imaging/Testing:    I interpreted and/or reviewed the patient's neuroimaging    IR-CERV CAROTID STENT WITH PROTECTION   Final Result         Initial angiogram showed a high grade stenosis of the internal carotid artery with 99% narrowing and collapse of the distal cervical left ICA.   This lesion was successfully crossed and a left internal carotid artery stent was deployed with a distal embolic protection device in place. The final angiographic images demonstrate patency of the left internal carotid artery with good antegrade flow    and a normal appearance of the intracranial circulation. The patient tolerated the procedure well without complications.      EC-ECHOCARDIOGRAM COMPLETE W/O CONT   Final Result      MR-BRAIN-W/O   Final Result      1.  Several punctate acute to subacute left temporal and parietal lobe infarcts.   2.  Slow flow or occlusion of the left internal carotid artery below the level of the clinoid process. There is reconstitution at the level of the [place].   3.  Mild diffuse cerebral and cerebellar substance loss.   4.  Mild microangiopathic ischemic change versus demyelination or gliosis.      DX-CHEST-PORTABLE (1 VIEW)   Final Result      Interstitial pulmonary edema      CT-CTA NECK WITH & W/O-POST PROCESSING   Final Result      Occlusion or subtotal occlusion of the proximal LEFT internal carotid artery with estimated 50% stenosis of the petrous portion of the LEFT internal carotid artery      CT-CTA HEAD WITH & W/O-POST PROCESS   Final Result      CT angiogram of the [place] within normal limits.         CT-CEREBRAL PERFUSION ANALYSIS   Final Result      1. Cerebral blood flow less than 30% possibly representing completed infarct = 0 mL.      2. T Max more than 6 seconds possibly representing combination of completed infarct and ischemia = 0 mL.      3. Mismatched volume possibly  "representing ischemic brain/penumbra= 0 mL      4.  Please note that this cerebral perfusion study and report is Quantitative and targets supratentorial (cerebral) perfusion for evaluation of large vessel territory acute ischemia/infarction. For example, lacunar infarcts, and brainstem/posterior fossa    ischemia/infarction are not evaluated on this study.  Data acquisition is subject to artifacts which can yield non-anatomically plausible perfusion maps which may be due to motion, bolus timing, signal to noise ratio, or other technical factors.    Perfusion map abnormalities which show non-anatomic distributions are likely artifact.   This study is not \"stand-alone\" and should only be utilized for diagnosis, management/treatment in correlation with CT, CTA, and/or MRI and clinical factors.         CT-HEAD W/O   Final Result      1.  No acute intracranial abnormality.   2.  Small areas of encephalomalacia in the cortex of the LEFT frontoparietal vertex   3.  Senescent changes                Assessment and Plan:  Leatha Desai is a 68 year old woman with transient R side symptoms and speech difficulties, found to have a critical L ICA stenosis.  She is now POD#1 from expedited L carotid stent placement for symptomatic carotid disease.  Secondary prevention is short-term DAPT, high intensity statin and BP control.      Problem list:   Transient neurologic symptoms   L carotid stenosis    Plan:   - may transition to q4h neurochecks/NIHSS   - liberalize SBP to , wean levophed as able.  If SBP > 90 for ~ 4 hours, may be discharged from Neurology perspective   - continue ASA 81mg daily indefinitely   - continue ticagrelor 90mg BID for total 3 months (end date is 2/2/2025)   - stroke labs:  HgbA1c 5.0,    - continue atorvastatin 80mg daily for goal LDL < 70   - Jelena is visiting from [place]- recommend Neuro-IR and stroke Neurology follow up once return home   - PT/OT/SLP   - please reconsult Stroke " Neurology with any additional questions or concerns    The evaluation of the patient, and recommended management, was discussed with Dr. Lechuga, ICU attending. I have performed a physical exam and reviewed and updated ROS and Plan today (12/2/2024).     Mike Locke MD  Vascular Neurology

## 2024-12-02 NOTE — DISCHARGE SUMMARY
Discharge Summary    CHIEF COMPLAINT ON ADMISSION  Chief Complaint   Patient presents with    Numbness     Pt reports right foot cramping approx 1 hour prior that radiated up her right leg and turned into numbness. Pt states the numbness then traveled up her right arm and she felt as though she cannot speak. Pt says that she feels some numbness in her RUE but leg numbness has subsided. -thinners/ASA       Reason for Admission  Numbness     Admission Date  11/29/2024    CODE STATUS  Full Code    HPI & HOSPITAL COURSE  Leatha Desai is a 68-year-old female with unremarkable PMHx as she has not seen a physician in decades.  Admitted 11/29 for right sided weakness and right facial droop.    Per history-patient noted progressive weakness starting in her right lower extremity up to her right upper extremity.  It was associated with a right-sided facial droop.  Symptoms lasted for approximately 30 minutes and resolved.  Similar symptoms that occurred approximately 24 hours prior.    In the emergency room-CT head showing small areas of encephalomalacia of the cortex and left frontoparietal vertex.  CTA neck with subtotal occlusion of left proximal internal carotid artery.  CTA head normal.    MRI brain showed several punctate acute to subacute left temporal and parietal lobe infarcts, slow flow or occlusion of the left internal carotid artery below the level of the clinoid process. Mild diffuse cerebral and cerebellar substance loss. Mild microangiopathic ischemic change versus demyelination or gliosis.    SHe has dyslipidemia with LDL:133, total cholesterol:203 and she was started on atorvastatin 80mg nightly.    Patient ambulatory and stable on day of discharge. The importance of medication compliance to keep her new stent open was discussed.  She is recommended to follow up with her primary care and to have evaluation with a neurologist or neuro radiologist for her stent.      Therefore, she is discharged in good and  stable condition to home with close outpatient follow-up.    The patient met 2-midnight criteria for an inpatient stay at the time of discharge.    Discharge Date  12/2/2024    FOLLOW UP ITEMS POST DISCHARGE  None    DISCHARGE DIAGNOSES  Principal Problem:    Carotid stenosis, left (POA: Yes)  Active Problems:    Acute CVA (cerebrovascular accident) (HCC) (POA: Yes)  Resolved Problems:    * No resolved hospital problems. *      FOLLOW UP  No future appointments.  Your [place] primary provider    Schedule an appointment as soon as possible for a visit in 1 week(s)        MEDICATIONS ON DISCHARGE     Medication List        START taking these medications        Instructions   aspirin 81 MG Chew chewable tablet  Start taking on: December 3, 2024  Commonly known as: Asa   Chew 1 Tablet every day.  Dose: 81 mg     atorvastatin 80 MG tablet  Commonly known as: Lipitor   Take 1 Tablet by mouth every evening.  Dose: 80 mg     ticagrelor 90 MG Tabs tablet  Commonly known as: Brilinta   Take 1 Tablet by mouth 2 times a day.  Dose: 90 mg            CONTINUE taking these medications        Instructions   Multivitamin Adults 50+ Tabs   Take 1 Tablet by mouth every 48 hours.  Dose: 1 Tablet              Allergies  Allergies   Allergen Reactions    Bloodless        DIET  Orders Placed This Encounter   Procedures    Diet Order Diet: Regular     Standing Status:   Standing     Number of Occurrences:   1     Order Specific Question:   Diet:     Answer:   Regular [1]       ACTIVITY  As tolerated.  Weight bearing as tolerated    CONSULTATIONS  Neurology  Interventional Radiology  Critical Care    PROCEDURES  12/1/2024 Left ICA stent placement for a Left ICA 99% stenosis. 8 x 29 Wall stent deployed with good results     LABORATORY  Lab Results   Component Value Date    SODIUM 137 12/02/2024    POTASSIUM 4.1 12/02/2024    CHLORIDE 106 12/02/2024    CO2 20 12/02/2024    GLUCOSE 122 (H) 12/02/2024    BUN 9 12/02/2024    CREATININE  1.02 12/02/2024        Lab Results   Component Value Date    WBC 9.3 12/02/2024    HEMOGLOBIN 13.9 12/02/2024    HEMATOCRIT 40.7 12/02/2024    PLATELETCT 202 12/02/2024     IR-CERV CAROTID STENT WITH PROTECTION   Final Result         Initial angiogram showed a high grade stenosis of the internal carotid artery with 99% narrowing and collapse of the distal cervical left ICA.   This lesion was successfully crossed and a left internal carotid artery stent was deployed with a distal embolic protection device in place. The final angiographic images demonstrate patency of the left internal carotid artery with good antegrade flow    and a normal appearance of the intracranial circulation. The patient tolerated the procedure well without complications.      EC-ECHOCARDIOGRAM COMPLETE W/O CONT   Final Result      MR-BRAIN-W/O   Final Result      1.  Several punctate acute to subacute left temporal and parietal lobe infarcts.   2.  Slow flow or occlusion of the left internal carotid artery below the level of the clinoid process. There is reconstitution at the level of the [place].   3.  Mild diffuse cerebral and cerebellar substance loss.   4.  Mild microangiopathic ischemic change versus demyelination or gliosis.      DX-CHEST-PORTABLE (1 VIEW)   Final Result      Interstitial pulmonary edema      CT-CTA NECK WITH & W/O-POST PROCESSING   Final Result      Occlusion or subtotal occlusion of the proximal LEFT internal carotid artery with estimated 50% stenosis of the petrous portion of the LEFT internal carotid artery      CT-CTA HEAD WITH & W/O-POST PROCESS   Final Result      CT angiogram of the [place] within normal limits.         CT-CEREBRAL PERFUSION ANALYSIS   Final Result      1. Cerebral blood flow less than 30% possibly representing completed infarct = 0 mL.      2. T Max more than 6 seconds possibly representing combination of completed infarct and ischemia = 0 mL.      3. Mismatched volume  "possibly representing ischemic brain/penumbra= 0 mL      4.  Please note that this cerebral perfusion study and report is Quantitative and targets supratentorial (cerebral) perfusion for evaluation of large vessel territory acute ischemia/infarction. For example, lacunar infarcts, and brainstem/posterior fossa    ischemia/infarction are not evaluated on this study.  Data acquisition is subject to artifacts which can yield non-anatomically plausible perfusion maps which may be due to motion, bolus timing, signal to noise ratio, or other technical factors.    Perfusion map abnormalities which show non-anatomic distributions are likely artifact.   This study is not \"stand-alone\" and should only be utilized for diagnosis, management/treatment in correlation with CT, CTA, and/or MRI and clinical factors.         CT-HEAD W/O   Final Result      1.  No acute intracranial abnormality.   2.  Small areas of encephalomalacia in the cortex of the LEFT frontoparietal vertex   3.  Senescent changes              12/1/24 Echo:  CONCLUSIONS  No prior study is available for comparison.   Normal left ventricular systolic function.   No evidence of valvular abnormality based on Doppler evaluation. Agitated saline (bubble) study was performed with and without Valsalva maneuver. No evidence of right to left shunt by agitated saline study. Existing IV was used at the left hand.    Total time of the discharge process exceeds 35 minutes.  "

## 2024-12-02 NOTE — PROGRESS NOTES
Radiology Progress Note   Author: ROSARIO Mayorga Date & Time created: 12/2/2024  9:22 AM   Date of admission  11/29/2024  Note to reader: this note follows the APSO format rather than the historical SOAP format. Assessment and plan located at the top of the note for ease of use.    Chief Complaint  68 y.o. female admitted 11/29/2024 with   Chief Complaint   Patient presents with    Numbness     Pt reports right foot cramping approx 1 hour prior that radiated up her right leg and turned into numbness. Pt states the numbness then traveled up her right arm and she felt as though she cannot speak. Pt says that she feels some numbness in her RUE but leg numbness has subsided. -thinners/ASA         HPI  68-year-old female who has not been to the doctor in many years admitted 11/29/2024 for CTA neck finding of near complete occlusion of left proximal internal carotid artery after presenting with right-sided weakness and right facial droop.  Neurointerventional radiology was consulted and patient underwent a left ICA stent placement with FRANCIS Hernandez on 12/01/2024.     Interval History:   12/02/24 - no acute events overnight.  Neuro intact.  Patient ambulating back to room from bathroom at start of visit.  Labs reviewed by me including WBC 9.3, creatinine 1.02, , total cholesterol 203.  Patient discussed with critical care team, IDT notes reviewed.    Assessment/Plan     Principal Problem:    Carotid stenosis, left  Active Problems:    Acute CVA (cerebrovascular accident) (HCC)      Plan IR  - Post FRANCIS groin access site instructions: no lifting greater than 5 lbs and no baths/swimming/soaking in tub for 7-10 days. Shower OK. OK to change dressings/band aid as needed.  - Continue Brilinta 90 mg twice daily and ASA 81 mg daily for 90 days postprocedure.    - Patient lives in [place] and will have to be referred to a neurointerventional radiologist by her primary care provider for follow-up.  -  Neuro Checks per ICU policy  - From a Neuro Interventional Service standpoint, OK to discharge when medically cleared.    -  -Thank you for allowing Interventional Radiology team to participate in the patients care, if any additional care or requests are needed in the future please do not hesitate to call or place IR order           Review of Systems  Physical Exam   Review of Systems   Constitutional:  Negative for chills and fever.   Respiratory:  Negative for shortness of breath.    Cardiovascular:  Negative for chest pain and palpitations.   Gastrointestinal:  Negative for nausea and vomiting.   Neurological:  Negative for tingling, sensory change, speech change, weakness and headaches.      Vitals:    12/02/24 0845   BP: 110/55   Pulse: 84   Resp:    Temp:    SpO2: 97%        Physical Exam  Constitutional:       General: She is not in acute distress.     Appearance: Normal appearance.   Cardiovascular:      Rate and Rhythm: Normal rate.   Pulmonary:      Effort: Pulmonary effort is normal. No respiratory distress.   Abdominal:      General: There is no distension.   Skin:     General: Skin is warm and dry.      Comments: Right groin access site soft, non-tender, dressing cdi.  Moderate ecchymosis surrounding site.   Neurological:      Mental Status: She is alert.      Comments: A/O x 4  PEARLA  Face symmetrical  Speech fluent  Shoulder shrug intact  Antigravity with no downward drift in all extremities  5/5 strength  Sensation intact    Psychiatric:         Mood and Affect: Mood normal.         Behavior: Behavior normal.             Labs    Recent Labs     11/29/24 2027 12/01/24 0415 12/02/24  0430   WBC 7.9 6.4 9.3   RBC 4.79 4.57 4.17*   HEMOGLOBIN 15.9 14.9 13.9   HEMATOCRIT 46.3 44.6 40.7   MCV 96.7 97.6 97.6   MCH 33.2* 32.6 33.3*   MCHC 34.3 33.4 34.2   RDW 44.0 44.8 45.1   PLATELETCT 181 168 202   MPV 13.3* 13.5* 13.7*     Recent Labs     11/29/24 2027 12/01/24  0945 12/02/24  0430   SODIUM 136 140  137   POTASSIUM 4.0 4.4 4.1   CHLORIDE 101 107 106   CO2 21 22 20   GLUCOSE 102* 88 122*   BUN 15 8 9   CREATININE 0.99 0.92 1.02   CALCIUM 9.9 9.6 9.2     Recent Labs     11/29/24 2027 12/01/24  0945 12/02/24  0430   ALBUMIN 4.5  --   --    TBILIRUBIN 0.2  --   --    ALKPHOSPHAT 96  --   --    TOTPROTEIN 7.4  --   --    ALTSGPT 28  --   --    ASTSGOT 28  --   --    CREATININE 0.99 0.92 1.02     EC-ECHOCARDIOGRAM COMPLETE W/O CONT   Final Result      MR-BRAIN-W/O   Final Result      1.  Several punctate acute to subacute left temporal and parietal lobe infarcts.   2.  Slow flow or occlusion of the left internal carotid artery below the level of the clinoid process. There is reconstitution at the level of the [place].   3.  Mild diffuse cerebral and cerebellar substance loss.   4.  Mild microangiopathic ischemic change versus demyelination or gliosis.      DX-CHEST-PORTABLE (1 VIEW)   Final Result      Interstitial pulmonary edema      CT-CTA NECK WITH & W/O-POST PROCESSING   Final Result      Occlusion or subtotal occlusion of the proximal LEFT internal carotid artery with estimated 50% stenosis of the petrous portion of the LEFT internal carotid artery      CT-CTA HEAD WITH & W/O-POST PROCESS   Final Result      CT angiogram of the [place] within normal limits.         CT-CEREBRAL PERFUSION ANALYSIS   Final Result      1. Cerebral blood flow less than 30% possibly representing completed infarct = 0 mL.      2. T Max more than 6 seconds possibly representing combination of completed infarct and ischemia = 0 mL.      3. Mismatched volume possibly representing ischemic brain/penumbra= 0 mL      4.  Please note that this cerebral perfusion study and report is Quantitative and targets supratentorial (cerebral) perfusion for evaluation of large vessel territory acute ischemia/infarction. For example, lacunar infarcts, and brainstem/posterior fossa    ischemia/infarction are not evaluated on this study.  " Data acquisition is subject to artifacts which can yield non-anatomically plausible perfusion maps which may be due to motion, bolus timing, signal to noise ratio, or other technical factors.    Perfusion map abnormalities which show non-anatomic distributions are likely artifact.   This study is not \"stand-alone\" and should only be utilized for diagnosis, management/treatment in correlation with CT, CTA, and/or MRI and clinical factors.         CT-HEAD W/O   Final Result      1.  No acute intracranial abnormality.   2.  Small areas of encephalomalacia in the cortex of the LEFT frontoparietal vertex   3.  Senescent changes            IR-CERV CAROTID STENT WITH PROTECTION    (Results Pending)     INR   Date Value Ref Range Status   11/29/2024 0.95 0.87 - 1.13 Final     Comment:     INR - Non-therapeutic Reference Range: 0.87-1.13  INR - Therapeutic Reference Range: 2.0-4.0       No results found for: \"POCINR\"     Intake/Output Summary (Last 24 hours) at 12/2/2024 0922  Last data filed at 12/2/2024 0822  Gross per 24 hour   Intake 1646.3 ml   Output --   Net 1646.3 ml      I have personally reviewed the above labs and imaging      I have performed a physical exam and reviewed and updated ROS and Plan today (12/2/2024).     48 minutes in directly providing and coordinating care and extensive data review.  No time overlap and excludes procedures.  "

## 2024-12-02 NOTE — CARE PLAN
The patient is Watcher - Medium risk of patient condition declining or worsening    Shift Goals  Clinical Goals: SBP goal 110-140, S/P stent placement  Patient Goals: Leave tomorrow  Family Goals: CATY    Progress made toward(s) clinical / shift goals:    Problem: Optimal Care of the Stroke Patient  Goal: Optimal acute care for the stroke patient  Outcome: Progressing     Problem: Knowledge Deficit - Stroke Education  Goal: Patient's knowledge of stroke and risk factors will improve  Outcome: Progressing     Problem: Psychosocial - Patient Condition  Goal: Patient's ability to verbalize feelings about condition will improve  Outcome: Progressing     Problem: Neuro Status  Goal: Neuro status will remain stable or improve  Outcome: Progressing     Problem: Urinary Elimination  Goal: Establish and maintain regular urinary output  Outcome: Progressing     Problem: Mobility - Stroke  Goal: Patient's capacity to carry out activities will improve  Outcome: Progressing     Problem: Self Care  Goal: Patient will have the ability to perform ADLs independently or with assistance (bathe, groom, dress, toilet and feed)  Outcome: Progressing     Problem: Pain - Standard  Goal: Alleviation of pain or a reduction in pain to the patient’s comfort goal  Outcome: Progressing     Problem: Fall Risk  Goal: Patient will remain free from falls  Outcome: Progressing       Patient is not progressing towards the following goals:      Problem: Hemodynamic Monitoring  Goal: Patient's hemodynamics, fluid balance and neurologic status will be stable or improve  Outcome: Not Progressing   SBP goal not met, requiring Levo to maintain -140

## 2024-12-11 ENCOUNTER — TELEPHONE (OUTPATIENT)
Dept: NEUROLOGY | Facility: MEDICAL CENTER | Age: 69
End: 2024-12-11
Payer: COMMERCIAL

## 2024-12-12 ENCOUNTER — TELEPHONE (OUTPATIENT)
Dept: NEUROLOGY | Facility: MEDICAL CENTER | Age: 69
End: 2024-12-12
Payer: COMMERCIAL

## 2024-12-13 ENCOUNTER — TELEPHONE (OUTPATIENT)
Dept: NEUROLOGY | Facility: MEDICAL CENTER | Age: 69
End: 2024-12-13
Payer: COMMERCIAL